# Patient Record
Sex: FEMALE | Race: WHITE | Employment: FULL TIME | URBAN - METROPOLITAN AREA
[De-identification: names, ages, dates, MRNs, and addresses within clinical notes are randomized per-mention and may not be internally consistent; named-entity substitution may affect disease eponyms.]

---

## 2017-01-23 LAB
ANTIBODY SCREEN, EXTERNAL: NEGATIVE
HBSAG, EXTERNAL: NEGATIVE
HIV, EXTERNAL: NEGATIVE
RPR, EXTERNAL: NON REACTIVE
RUBELLA, EXTERNAL: NORMAL
TYPE, ABO & RH, EXTERNAL: NORMAL

## 2017-06-14 ENCOUNTER — HOSPITAL ENCOUNTER (OUTPATIENT)
Dept: ULTRASOUND IMAGING | Age: 32
Discharge: HOME | End: 2017-06-14
Attending: OBSTETRICS & GYNECOLOGY

## 2017-06-14 ENCOUNTER — HOSPITAL ENCOUNTER (OUTPATIENT)
Dept: VASCULAR SURGERY | Age: 32
Discharge: HOME | End: 2017-06-14
Attending: OBSTETRICS & GYNECOLOGY
Payer: COMMERCIAL

## 2017-06-14 DIAGNOSIS — R60.0 LEG EDEMA, LEFT: ICD-10-CM

## 2017-06-14 DIAGNOSIS — R60.0 EDEMA LEG: ICD-10-CM

## 2017-06-14 PROCEDURE — 93971 EXTREMITY STUDY: CPT

## 2017-07-03 ENCOUNTER — TELEPHONE (OUTPATIENT)
Dept: INTERNAL MEDICINE CLINIC | Age: 32
End: 2017-07-03

## 2017-08-07 LAB — GRBS, EXTERNAL: NEGATIVE

## 2017-08-25 ENCOUNTER — HOSPITAL ENCOUNTER (INPATIENT)
Age: 32
LOS: 3 days | Discharge: HOME | End: 2017-08-28
Attending: OBSTETRICS & GYNECOLOGY | Admitting: OBSTETRICS & GYNECOLOGY
Payer: COMMERCIAL

## 2017-08-25 ENCOUNTER — ANESTHESIA EVENT (OUTPATIENT)
Dept: LABOR AND DELIVERY | Age: 32
End: 2017-08-25
Payer: COMMERCIAL

## 2017-08-25 ENCOUNTER — ANESTHESIA (OUTPATIENT)
Dept: LABOR AND DELIVERY | Age: 32
End: 2017-08-25
Payer: COMMERCIAL

## 2017-08-25 LAB
BASOPHILS # BLD: 0 K/UL (ref 0–0.1)
BASOPHILS NFR BLD: 0 % (ref 0–1)
EOSINOPHIL # BLD: 0 K/UL (ref 0–0.4)
EOSINOPHIL NFR BLD: 0 % (ref 0–7)
ERYTHROCYTE [DISTWIDTH] IN BLOOD BY AUTOMATED COUNT: 13.7 % (ref 11.5–14.5)
HCT VFR BLD AUTO: 37.4 % (ref 35–47)
HGB BLD-MCNC: 12.1 G/DL (ref 11.5–16)
LYMPHOCYTES # BLD: 2.1 K/UL (ref 0.8–3.5)
LYMPHOCYTES NFR BLD: 13 % (ref 12–49)
MCH RBC QN AUTO: 27.1 PG (ref 26–34)
MCHC RBC AUTO-ENTMCNC: 32.4 G/DL (ref 30–36.5)
MCV RBC AUTO: 83.9 FL (ref 80–99)
MONOCYTES # BLD: 1 K/UL (ref 0–1)
MONOCYTES NFR BLD: 6 % (ref 5–13)
NEUTS SEG # BLD: 13.4 K/UL (ref 1.8–8)
NEUTS SEG NFR BLD: 81 % (ref 32–75)
PLATELET # BLD AUTO: 326 K/UL (ref 150–400)
RBC # BLD AUTO: 4.46 M/UL (ref 3.8–5.2)
WBC # BLD AUTO: 16.5 K/UL (ref 3.6–11)

## 2017-08-25 PROCEDURE — 74011000250 HC RX REV CODE- 250

## 2017-08-25 PROCEDURE — 3E0R3CZ INTRODUCE REGIONAL ANESTH IN SPINAL CANAL, PERC: ICD-10-PCS | Performed by: ANESTHESIOLOGY

## 2017-08-25 PROCEDURE — 75410000002 HC LABOR FEE PER 1 HR

## 2017-08-25 PROCEDURE — 85025 COMPLETE CBC W/AUTO DIFF WBC: CPT | Performed by: OBSTETRICS & GYNECOLOGY

## 2017-08-25 PROCEDURE — 74011250636 HC RX REV CODE- 250/636: Performed by: ANESTHESIOLOGY

## 2017-08-25 PROCEDURE — 00HU33Z INSERTION OF INFUSION DEVICE INTO SPINAL CANAL, PERCUTANEOUS APPROACH: ICD-10-PCS | Performed by: ANESTHESIOLOGY

## 2017-08-25 PROCEDURE — 77030007880 HC KT SPN EPDRL BBMI -B

## 2017-08-25 PROCEDURE — 77030014125 HC TY EPDRL BBMI -B: Performed by: ANESTHESIOLOGY

## 2017-08-25 PROCEDURE — 74011250636 HC RX REV CODE- 250/636: Performed by: OBSTETRICS & GYNECOLOGY

## 2017-08-25 PROCEDURE — 65270000029 HC RM PRIVATE

## 2017-08-25 PROCEDURE — 77030011943

## 2017-08-25 PROCEDURE — 10907ZC DRAINAGE OF AMNIOTIC FLUID, THERAPEUTIC FROM PRODUCTS OF CONCEPTION, VIA NATURAL OR ARTIFICIAL OPENING: ICD-10-PCS | Performed by: OBSTETRICS & GYNECOLOGY

## 2017-08-25 PROCEDURE — 4A0HXCZ MEASUREMENT OF PRODUCTS OF CONCEPTION, CARDIAC RATE, EXTERNAL APPROACH: ICD-10-PCS | Performed by: OBSTETRICS & GYNECOLOGY

## 2017-08-25 PROCEDURE — 74011250636 HC RX REV CODE- 250/636

## 2017-08-25 PROCEDURE — 36415 COLL VENOUS BLD VENIPUNCTURE: CPT | Performed by: OBSTETRICS & GYNECOLOGY

## 2017-08-25 RX ORDER — SODIUM CHLORIDE 0.9 % (FLUSH) 0.9 %
5-10 SYRINGE (ML) INJECTION EVERY 8 HOURS
Status: DISCONTINUED | OUTPATIENT
Start: 2017-08-25 | End: 2017-08-28 | Stop reason: HOSPADM

## 2017-08-25 RX ORDER — DOXYLAMINE SUCCINATE AND PYRIDOXINE HYDROCHLORIDE, DELAYED RELEASE TABLETS 10 MG/10 MG 10; 10 MG/1; MG/1
TABLET, DELAYED RELEASE ORAL
COMMUNITY
End: 2017-08-28

## 2017-08-25 RX ORDER — BUPIVACAINE HYDROCHLORIDE 5 MG/ML
30 INJECTION, SOLUTION EPIDURAL; INTRACAUDAL AS NEEDED
Status: DISCONTINUED | OUTPATIENT
Start: 2017-08-25 | End: 2017-08-26 | Stop reason: HOSPADM

## 2017-08-25 RX ORDER — BUPIVACAINE HYDROCHLORIDE 5 MG/ML
INJECTION, SOLUTION EPIDURAL; INTRACAUDAL
Status: DISPENSED
Start: 2017-08-25 | End: 2017-08-26

## 2017-08-25 RX ORDER — SODIUM CHLORIDE 0.9 % (FLUSH) 0.9 %
5-10 SYRINGE (ML) INJECTION AS NEEDED
Status: DISCONTINUED | OUTPATIENT
Start: 2017-08-25 | End: 2017-08-28 | Stop reason: HOSPADM

## 2017-08-25 RX ORDER — FENTANYL CITRATE 50 UG/ML
INJECTION, SOLUTION INTRAMUSCULAR; INTRAVENOUS AS NEEDED
Status: DISCONTINUED | OUTPATIENT
Start: 2017-08-25 | End: 2017-08-26 | Stop reason: HOSPADM

## 2017-08-25 RX ORDER — FENTANYL/BUPIVACAINE/NS/PF 2-1250MCG
PREFILLED PUMP RESERVOIR EPIDURAL
Status: COMPLETED
Start: 2017-08-25 | End: 2017-08-25

## 2017-08-25 RX ORDER — FENTANYL/BUPIVACAINE/NS/PF 2-1250MCG
1-17 PREFILLED PUMP RESERVOIR EPIDURAL CONTINUOUS
Status: DISCONTINUED | OUTPATIENT
Start: 2017-08-25 | End: 2017-08-25 | Stop reason: SDUPTHER

## 2017-08-25 RX ORDER — LIDOCAINE HYDROCHLORIDE AND EPINEPHRINE 15; 5 MG/ML; UG/ML
INJECTION, SOLUTION EPIDURAL AS NEEDED
Status: DISCONTINUED | OUTPATIENT
Start: 2017-08-25 | End: 2017-08-26 | Stop reason: HOSPADM

## 2017-08-25 RX ORDER — NALOXONE HYDROCHLORIDE 0.4 MG/ML
0.4 INJECTION, SOLUTION INTRAMUSCULAR; INTRAVENOUS; SUBCUTANEOUS AS NEEDED
Status: DISCONTINUED | OUTPATIENT
Start: 2017-08-25 | End: 2017-08-26 | Stop reason: HOSPADM

## 2017-08-25 RX ORDER — FAMOTIDINE 10 MG/ML
INJECTION INTRAVENOUS
Status: COMPLETED
Start: 2017-08-25 | End: 2017-08-25

## 2017-08-25 RX ORDER — FAMOTIDINE 10 MG/ML
20 INJECTION INTRAVENOUS EVERY 12 HOURS
Status: DISCONTINUED | OUTPATIENT
Start: 2017-08-25 | End: 2017-08-28 | Stop reason: HOSPADM

## 2017-08-25 RX ORDER — FENTANYL/BUPIVACAINE/NS/PF 2-1250MCG
1-16 PREFILLED PUMP RESERVOIR EPIDURAL CONTINUOUS
Status: DISCONTINUED | OUTPATIENT
Start: 2017-08-25 | End: 2017-08-28 | Stop reason: HOSPADM

## 2017-08-25 RX ORDER — ONDANSETRON 2 MG/ML
4 INJECTION INTRAMUSCULAR; INTRAVENOUS
Status: DISCONTINUED | OUTPATIENT
Start: 2017-08-25 | End: 2017-08-28 | Stop reason: HOSPADM

## 2017-08-25 RX ORDER — FENTANYL CITRATE 50 UG/ML
INJECTION, SOLUTION INTRAMUSCULAR; INTRAVENOUS
Status: DISPENSED
Start: 2017-08-25 | End: 2017-08-26

## 2017-08-25 RX ORDER — OXYTOCIN IN 5 % DEXTROSE 30/500 ML
2-25 PLASTIC BAG, INJECTION (ML) INTRAVENOUS
Status: DISCONTINUED | OUTPATIENT
Start: 2017-08-25 | End: 2017-08-26 | Stop reason: HOSPADM

## 2017-08-25 RX ORDER — FENTANYL CITRATE 50 UG/ML
100 INJECTION, SOLUTION INTRAMUSCULAR; INTRAVENOUS ONCE
Status: DISPENSED | OUTPATIENT
Start: 2017-08-25 | End: 2017-08-26

## 2017-08-25 RX ORDER — SODIUM CHLORIDE, SODIUM LACTATE, POTASSIUM CHLORIDE, CALCIUM CHLORIDE 600; 310; 30; 20 MG/100ML; MG/100ML; MG/100ML; MG/100ML
125 INJECTION, SOLUTION INTRAVENOUS CONTINUOUS
Status: DISCONTINUED | OUTPATIENT
Start: 2017-08-25 | End: 2017-08-28 | Stop reason: HOSPADM

## 2017-08-25 RX ORDER — BUPIVACAINE HYDROCHLORIDE 5 MG/ML
INJECTION, SOLUTION EPIDURAL; INTRACAUDAL AS NEEDED
Status: DISCONTINUED | OUTPATIENT
Start: 2017-08-25 | End: 2017-08-26 | Stop reason: HOSPADM

## 2017-08-25 RX ORDER — FAMOTIDINE 20 MG/1
20 TABLET, FILM COATED ORAL 2 TIMES DAILY
COMMUNITY
End: 2017-08-28

## 2017-08-25 RX ORDER — FENTANYL CITRATE 50 UG/ML
100 INJECTION, SOLUTION INTRAMUSCULAR; INTRAVENOUS ONCE
Status: ACTIVE | OUTPATIENT
Start: 2017-08-25 | End: 2017-08-26

## 2017-08-25 RX ORDER — ONDANSETRON 2 MG/ML
INJECTION INTRAMUSCULAR; INTRAVENOUS
Status: COMPLETED
Start: 2017-08-25 | End: 2017-08-25

## 2017-08-25 RX ADMIN — FENTANYL 0.2 MG/100ML-BUPIV 0.125%-NACL 0.9% EPIDURAL INJ 11 ML/HR: 2/0.125 SOLUTION at 14:10

## 2017-08-25 RX ADMIN — SODIUM CHLORIDE, SODIUM LACTATE, POTASSIUM CHLORIDE, AND CALCIUM CHLORIDE 500 ML: 600; 310; 30; 20 INJECTION, SOLUTION INTRAVENOUS at 22:08

## 2017-08-25 RX ADMIN — LIDOCAINE HYDROCHLORIDE AND EPINEPHRINE 2 ML: 15; 5 INJECTION, SOLUTION EPIDURAL at 14:02

## 2017-08-25 RX ADMIN — FAMOTIDINE 20 MG: 10 INJECTION, SOLUTION INTRAVENOUS at 16:44

## 2017-08-25 RX ADMIN — ONDANSETRON 4 MG: 2 INJECTION INTRAMUSCULAR; INTRAVENOUS at 19:24

## 2017-08-25 RX ADMIN — LIDOCAINE HYDROCHLORIDE AND EPINEPHRINE 3 ML: 15; 5 INJECTION, SOLUTION EPIDURAL at 14:01

## 2017-08-25 RX ADMIN — SODIUM CHLORIDE, SODIUM LACTATE, POTASSIUM CHLORIDE, AND CALCIUM CHLORIDE 125 ML/HR: 600; 310; 30; 20 INJECTION, SOLUTION INTRAVENOUS at 23:21

## 2017-08-25 RX ADMIN — BUPIVACAINE HYDROCHLORIDE 2 ML: 5 INJECTION, SOLUTION EPIDURAL; INTRACAUDAL at 14:04

## 2017-08-25 RX ADMIN — SODIUM CHLORIDE, SODIUM LACTATE, POTASSIUM CHLORIDE, AND CALCIUM CHLORIDE 125 ML/HR: 600; 310; 30; 20 INJECTION, SOLUTION INTRAVENOUS at 20:41

## 2017-08-25 RX ADMIN — BUPIVACAINE HYDROCHLORIDE 2 ML: 5 INJECTION, SOLUTION EPIDURAL; INTRACAUDAL at 14:03

## 2017-08-25 RX ADMIN — FENTANYL CITRATE 50 MCG: 50 INJECTION, SOLUTION INTRAMUSCULAR; INTRAVENOUS at 14:05

## 2017-08-25 RX ADMIN — Medication 8 MILLI-UNITS/MIN: at 19:34

## 2017-08-25 RX ADMIN — Medication 2 MILLI-UNITS/MIN: at 17:41

## 2017-08-25 RX ADMIN — FENTANYL 0.2 MG/100ML-BUPIV 0.125%-NACL 0.9% EPIDURAL INJ 11 ML/HR: 2/0.125 SOLUTION at 23:42

## 2017-08-25 RX ADMIN — FENTANYL CITRATE 50 MCG: 50 INJECTION, SOLUTION INTRAMUSCULAR; INTRAVENOUS at 14:06

## 2017-08-25 NOTE — PROGRESS NOTES
Report received from Doc Granados RN    2120 Dr Kinjal Saldaña in room sve done discussing plan of action. 2202 Dr Kinjal Saldaña at desk strip viewed orders to stop pitocin; pitocin off    2207 Dr Kinjal Saldaña in room sve done arom of forebag done    2211 FSE applied procedure explained to pt by Dr Kinjal Saldaña    8/25/17    0007 sve done feeling pressure baby OP    0044 sve done c/c/+1 baby head transverse     0057 Dr Jonas Clinton in room bolus epidural for pressure. 36 Dr Kinjal Saldaña called report given on sve and position of baby doing position changes to rotate baby also epidural bolus to get pt comfortable. Instructed to continue to increase pitocin to get contraction in better pattern. 0205 pt complain that epidural level is up to her breast level epidural pump paused HOB elevated while call Dr [place] Clinton notified orders received to stop epidural pump for 45 minutes then restart. Pump off.    0327 Dr Kinjal Saldaña called report given baby OA at +2 station not feeling pressure continuing to increase pitocin will start pushing when pt feel pressure. No additional orders given. 0406 having late decels pitocin off Dr Kinjal Saldaña called to bedside fluid bolus started turn to left side    0503 spontaneous vaginal delivery of baby boy by Dr Kinjal Saldaña with positive heart rate taken to stabelet for assessment by Rn     0505 NICU call to delivery    0645 sponge bath completed gown changed     0725 Pt transferred to [facility]    0750 TRANSFER - OUT REPORT:    Verbal report given to SHELLY Barnes Rn(name) on [employer]  being transferred to [facility](unit) for routine progression of care       Report consisted of patients Situation, Background, Assessment and   Recommendations(SBAR). Information from the following report(s) SBAR, Procedure Summary, MAR and Recent Results was reviewed with the receiving nurse. Lines:   Peripheral IV 08/25/17 Left; Lower Arm (Active)        Opportunity for questions and clarification was provided.       Patient transported via stretcher in stable condition with belongings baby transferred with mother in open crib.

## 2017-08-25 NOTE — PROGRESS NOTES
1625 Bedside shift change report given to Meg Rodriguez RN (oncoming nurse) by Armen Vigil RN (offgoing nurse). Report included the following information SBAR.     1700 MD at  evaluating pt progress, deferred SVE at this time, evaluated EFM; continue to titrate pitocin. 1730 Bedside shift change report given to Samantha Mar RN (oncoming nurse) by Meg Rodriguez RN (offgoing nurse). Report included the following information SBAR.

## 2017-08-25 NOTE — IP AVS SNAPSHOT
2700 [facility] [address] [place] [address] 
[phone] Patient: Miranda Joiner MRN: [id number] :1985 You are allergic to the following No active allergies Immunizations Administered for This Admission Name Date MMR 2017 Recent Documentation Height Weight Breastfeeding? BMI OB Status Smoking Status 1.626 m 90.4 kg Yes 34.19 kg/m2 Recent pregnancy Former Smoker Unresulted Labs Order Current Status SAMPLE TO BLOOD BANK In process Emergency Contacts Name Discharge Info Relation Home Work Mobile Tati Meadows   [phone] About your hospitalization You were admitted on:  2017 You last received care in the:  [address] [facility] You were discharged on:  2017 Unit phone number:  [phone] Why you were hospitalized Your primary diagnosis was:  Not on File Your diagnoses also included:  Labor Without Complication Providers Seen During Your Hospitalizations Provider Role Specialty Primary office phone Pete Castillo MD Attending Provider Obstetrics & Gynecology [phone] Your Primary Care Physician (PCP) Primary Care Physician Office Phone Office Fax Gabino Jackson [address], [facility] [phone] Follow-up Information Follow up With Details Comments Contact Info [facility] Call As needed with breastfeeding questions [address] [place] [address] Drive, [address] 
[phone] Hunter Cannon MD   [address] [facility] [address] [place] [address] 
[phone] Pete Castillo MD Schedule an appointment as soon as possible for a visit in 6 weeks Routine postpartum follow-up appointment 217 [facility] 201 1400 [address] [place] [address] 
[phone] Current Discharge Medication List  
  
START taking these medications Dose & Instructions Dispensing Information Comments Morning Noon Evening Bedtime  
 oxyCODONE-acetaminophen 5-325 mg per tablet Commonly known as:  PERCOCET Your last dose was: Your next dose is:    
   
   
 Dose:  1 Tab Take 1 Tab by mouth every four (4) hours as needed. Max Daily Amount: 6 Tabs. Quantity:  10 Tab Refills:  0 CONTINUE these medications which have CHANGED Dose & Instructions Dispensing Information Comments Morning Noon Evening Bedtime  
 ibuprofen 800 mg tablet Commonly known as:  MOTRIN What changed:   
- medication strength 
- how much to take - when to take this 
- reasons to take this Your last dose was: Your next dose is:    
   
   
 Dose:  800 mg Take 1 Tab by mouth every eight (8) hours. Quantity:  30 Tab Refills:  0 CONTINUE these medications which have NOT CHANGED Dose & Instructions Dispensing Information Comments Morning Noon Evening Bedtime RIGHT STEP PRENATAL VITAMINS 27 mg iron- 0.8 mg Tab tablet Generic drug:  prenatal vit-iron fumarate-fa Your last dose was: Your next dose is:    
   
   
 Dose:  1 Tab Take 1 Tab by mouth daily. Refills:  0 STOP taking these medications   
 acyclovir 5 % ointment Commonly known as:  ZOVIRAX  
   
  
 antipyrine-benzocaine 5.5-1.4 % Drop Commonly known as:  AURALGAN  
   
  
 azithromycin 250 mg tablet Commonly known as:  Toxey Maria D DICLEGIS 10-10 mg Tbec Generic drug:  doxylamine-pyridoxine Krunal & Krunal DM 60-1,200 mg Tm12 Generic drug:  Dextromethorphan-guaiFENesin ORTHO TRI-CYCLEN LO (28) 0.18/0.215/0.25 mg-25 mcg Tab Generic drug:  norgestimate-ethinyl estradiol PEPCID 20 mg tablet Generic drug:  famotidine Where to Get Your Medications Information on where to get these meds will be given to you by the nurse or doctor. ! Ask your nurse or doctor about these medications  
  ibuprofen 800 mg tablet  
 oxyCODONE-acetaminophen 5-325 mg per tablet Discharge Instructions Discharge Instructions for Vaginal Delivery Patient ID: 
Radha Samples [id number] 
27 y.o. 
1985 Take Home Medications See medication list 
Continue taking your prenatal vitamins if you are breastfeeding. Follow-up Appointment: 
Follow-up with Dr. Barahona Matters in 6 weeks. Follow-up care is a key part of your treatment and safety. Be sure to make and go to all appointments, and call your doctor if you are having problems. Its also a good idea to know your test results and keep a list of the medicines you take. Activity Avoid anything in your vagina for 6 weeks (no intercourse, tampons, or douching). You may drive unless you are taking prescription pain medications. Climbing stairs and light lifting are ok after a vaginal delivery unless your doctor tells you not to. You may gradually work up to exercise over the next few weeks, but take it easy- you'll be tired! Diet You may eat a regular diet but you may want to avoid heavy, greasy foods and other foods that could increase constipation. Wound care If you have stitches, continue to rinse with a squirt bottle of warm water each time you use the bathroom for about 2 weeks. Your stitches will gradually dissolve over several weeks. Sitz baths are also helpful to keep the wound clean, encourage healing, and to help with pain associated with the stitches or hemorrhoids. You can use either a sitz bath basin or a bathtub filled with 2-3\" inches of plain warm water. Soak for 10 minutes 3 times a day. Pain Management If you were not given a prescription pain medication, you can take over the counter pain medicines like Tylenol (acetominophen), Advil or Motrin (ibuprofen). You can take acetominophen and ibuprofen together, alternating doses every few hours. You will get the most relief if you take the maximum dose: · Tylenol or acetominophen 1000 mg every 6 hours (equivalent to 2 Extra Strength Tylenols every 6 hours) · Motrin or Advil (generic ibuprofen) 800 mg every 8 hours (4 tablets or capsules every 8 hours) If you were given a prescription pain medication, you can take ibuprofen along with it (doses as above), but not Tylenol. Use ibuprofen as the main medication, and take the prescription medication if needed for more severe pain not relieved by the ibuprofen. Your goal should be to take only the minimum necessary amounts of the prescription medication (narcotic), as these pain medicines can be habit-forming and will worsen or cause constipation. Most patients will find that within a couple of days, their pain is adequately controlled using only over-the-counter medications. A heating pad can also be very helpful for cramping or back pain. Over-the-counter hemorrhoid wipes and ointments are fine to use if you have hemorrhoids. Constipation You may find that bowel movements are irregular after delivery and that you have a tendency to be constipated. If you have stitches (and especially if you had a more severe tear called a third- or fourth-degree), your bowel movements will be more comfortable if they are soft. A stool softener such as Colace (docusate) can safely be taken daily if needed. If you become constipated you can use a laxative such as Dulcolax, Miralax or Milk of Magnesia. Don't wait until constipation is severe- take something sooner rather than later and you will feel much better! Your Recovery: What to Expect at [facility] Delivering a baby is hard work and you probably aren't getting much sleep, so you will certainly be tired. Try to rest when you can and don't worry about doing housework or other tasks which can wait. If you're experiencing soreness or swelling in your bottom, this should improve over the next few days to 2 weeks. You are likely to have some back pain or general body aches or muscle soreness. This should improve with acetominophen or ibuprofen. If your legs were swollen during your pregnancy or as a result of IV fluids given during your hospital stay, this should go away in a few days to a week. Most women experience some form of the \"Baby Blues\" after having a baby. This means that you may feel emotional, tearful, frustrated, anxious, sad, and irritable some of the time. This is normal if it's not severe and should go away after about 2 weeks. Getting as much rest as you can will help. Accept assistance from friends and family members so that you can take breaks from caring for the baby. Call your doctor if your symptoms seem severe, last more than 2 weeks, or seem to be getting worse instead of better. Get help immediately if you have thoughts of wanting to hurt yourself or others! When should you call for help? Call 911 anytime you think you may need emergency care. For example, call if: You pass out (lose consciousness). You have sudden chest pain and shortness of breath, or you cough up blood. You have severe pain in your belly. Call your doctor now or seek immediate medical care if: 
You have heavy vaginal bleeding that soaks one or more pads in an hour, or you have large clots (golf ball size or larger). Your have foul-smelling discharge from your vagina. You are sick to your stomach or cannot keep fluids down. You have pain that does not get better after you take pain medicine. You have signs of infection, such as: Increased pain in your abdomen or vaginal area Red streaks, warmth, or tenderness of your breasts. A fever of 101 or greater (taken by mouth). You have signs of a blood clot, such as: 
Pain in your calf, back of knee, thigh, or groin. Redness and swelling in your leg or groin. You have trouble passing urine or stool, especially if you have pain or swelling in your lower belly; or if you are unable to have a bowel movement after taking a laxative. You have a fast or pounding heartbeat. Discharge Orders None [employer] Announcement We are excited to announce that we are making your provider's discharge notes available to you in [employer]. You will see these notes when they are completed and signed by the physician that discharged you from your recent hospital stay. If you have any questions or concerns about any information you see in [employer], please call the Health Information Department where you were seen or reach out to your Primary Care Provider for more information about your plan of care. Introducing [address]! [employer] introduces [employer] patient portal. Now you can access parts of your medical record, email your doctor's office, and request medication refills online. 1. In your internet browser, go to [email or web address] 2. Click on the First Time User? Click Here link in the Sign In box. You will see the New Member Sign Up page. 3. Enter your [employer] Access Code exactly as it appears below. You will not need to use this code after youve completed the sign-up process. If you do not sign up before the expiration date, you must request a new code. · [employer] Access Code: [email or web address] Expires: 9/12/2017  4:08 PM 
 
4. Enter the last four digits of your Social Security Number (xxxx) and Date of Birth (mm/dd/yyyy) as indicated and click Submit. You will be taken to the next sign-up page. 5. Create a [employer] ID. This will be your [employer] login ID and cannot be changed, so think of one that is secure and easy to remember. 6. Create a [employer] password. You can change your password at any time. 7. Enter your Password Reset Question and Answer.  This can be used at a later time if you forget your password. 8. Enter your e-mail address. You will receive e-mail notification when new information is available in [address] 9. Click Sign Up. You can now view and download portions of your medical record. 10. Click the Download Summary menu link to download a portable copy of your medical information. If you have questions, please visit the Frequently Asked Questions section of the [employer] website. Remember, [employer] is NOT to be used for urgent needs. For medical emergencies, dial 911. Now available from your iPhone and Android! General Information Please provide this summary of care documentation to your next provider. Patient Signature:  ____________________________________________________________ Date:  ____________________________________________________________  
  
Jessi Parry Provider Signature:  ____________________________________________________________ Date:  ____________________________________________________________

## 2017-08-25 NOTE — H&P
History & Physical    Name: Jyothi Amanda MRN: [id number]  SSN: [id number]    YOB: 1985  Age: 28 y.o. Sex: female        Subjective:     Estimated Date of Delivery: 17  OB History    Para Term  AB Living   1        SAB TAB Ectopic Molar Multiple Live Births              # Outcome Date GA Lbr Rui/2nd Weight Sex Delivery Anes PTL Lv   1 Current                   Ms. Corey Rodriguez is admitted with pregnancy at 36w3d for active labor. Prenatal course was complicated by rubella non-immune, mildly decreased TSH and elevated T3-no treatment. Please see prenatal records for details. Past Medical History:   Diagnosis Date    Herpes simplex virus (HSV) infection     Cold sores- winter 2016    Other ill-defined conditions     IBS; thyroid issues    Thyroid activity decreased     Mildly decreased TSH, mildly elevated T3, no current treatment     Past Surgical History:   Procedure Laterality Date    HX WISDOM TEETH EXTRACTION       Social History     Occupational History    Not on file. Social History Main Topics    Smoking status: Former Smoker     Years: 7.00     Quit date: 2016    Smokeless tobacco: Never Used    Alcohol use Yes      Comment: Not during Pregnancy    Drug use: No    Sexual activity: Yes     Birth control/ protection: Pill, None     Family History   Problem Relation Age of Onset    No Known Problems Mother     Diabetes Father     No Known Problems Brother     Heart Disease Maternal Grandmother     Heart Disease Maternal Grandfather     Cancer Paternal Grandmother      unknown    Cancer Paternal Grandfather      unknown       No Known Allergies  Prior to Admission medications    Medication Sig Start Date End Date Taking? Authorizing Provider   doxylamine-pyridoxine (DICLEGIS) 10-10 mg TbEC Take  by mouth.  Indications: NAUSEA GRAVIDARUM   Yes Historical Provider   prenatal vit-iron fumarate-fa (RIGHT STEP PRENATAL VITAMINS) 27 mg iron- 0.8 mg tab tablet Take 1 Tab by mouth daily. Yes Historical Provider   famotidine (PEPCID) 20 mg tablet Take 20 mg by mouth two (2) times a day. Yes Historical Provider   antipyrine-benzocaine (AURALGAN) 5.5-1.4 % drop 2 Drops by Otic route four (4) times daily as needed. 2/28/14   Brandy Stephens III, MD   Dextromethorphan-Guaifenesin [facility] DM) 60-1,200 mg TM12 Take 1 Cap by mouth two (2) times a day. 2/26/14   Brandy Stephens III, MD   azithromycin (ZITHROMAX) 250 mg tablet Take 2 tablets today, then take 1 tablet daily. 2/26/14   Brandy Stephens III, MD   ibuprofen (ADVIL) 200 mg tablet Take 3 Tabs by mouth every eight (8) hours as needed for Pain. 2/26/14   Brandy Stephens III, MD   acyclovir (ZOVIRAX) 5 % ointment Apply  to affected area every three (3) hours. 2/26/14   Samir Tyson MD   ORTHO TRI-CYCLEN LO 0.18/0.215/0.25 mg-25 mcg tablet  8/4/10   Historical Provider        Review of Systems: Pertinent items are noted in HPI. Objective:     Vitals:  Vitals:    08/25/17 1445 08/25/17 1500 08/25/17 1539 08/25/17 1540   BP: 112/51 (!) 88/69  103/60   Pulse: (!) 102 (!) 112  91   Resp: 16 16  16   Temp:       SpO2: 98% 99% 98%    Weight:       Height:            Physical Exam:  Patient without distress.   Abdomen: soft, nontender, gravid  Cervical Exam: 4 cm dilated    60% effaced    -2 station    Presenting Part: cephalic  Lower Extremities:  - Edema 1+  Membranes:  intact and bulging  Fetal Heart Rate: Reactive  Baseline: 125s per minute  Variability: moderate  Accelerations: yes  Decelerations: none  Uterine contractions: irregular, every 3-7 minutes    Prenatal Labs:   Lab Results   Component Value Date/Time    Rubella, External Non-Immune 01/23/2017    GrBStrep, External Negative 08/07/2017    HBsAg, External Negative 01/23/2017    HIV, External Negative 01/23/2017    RPR, External Non Reactive 01/23/2017    ABO,Rh O POSITIVE 01/23/2017         Assessment/Plan:     Active Problems:    Labor without complication (8/25/2017)         Plan: Admit for Labor  Not progressing normally  start pitocin augmentation. Group B Strep was negative.     Signed By:  Mehreen Jackson, DO     August 25, 2017

## 2017-08-25 NOTE — ANESTHESIA PROCEDURE NOTES
Epidural Block    Start time: 8/25/2017 1:55 PM  End time: 8/25/2017 2:07 PM  Performed by: Janis Brooks by: Rajesh Kimbrough     Pre-Procedure  Indication: labor epidural    Preanesthetic Checklist: patient identified, risks and benefits discussed, anesthesia consent, site marked, patient being monitored, timeout performed and anesthesia consent    Timeout Time: 13:55        Epidural:   Patient position:  Left lateral decubitus  Prep region:  Lumbar  Prep: Chlorhexidine    Location:  L3-4    Needle and Epidural Catheter:   Needle Type:  Tuohy  Needle Gauge:  17 G  Injection Technique:  Loss of resistance using air  Attempts:  1  Catheter Size:  19 G  Catheter at Skin Depth (cm):  11  Depth in Epidural Space (cm):  6  Events: no blood with aspiration, no cerebrospinal fluid with aspiration, no paresthesia and negative aspiration test    Test Dose:  Negative and lidocaine 1.5% w/ epi    Assessment:   Catheter Secured:  Tegaderm and tape  Insertion:  Uncomplicated  Patient tolerance:  Patient tolerated the procedure well with no immediate complications

## 2017-08-25 NOTE — PROGRESS NOTES
1037 Patient arrived from Dr Marina Pierre office for R/O labor. Patient says been having regular contractions since 1900 last night.  Positive fetal movement, no vaginal bleeding or leaking of fluid

## 2017-08-25 NOTE — IP AVS SNAPSHOT
[address] 
[phone] Patient: Ady Amezcua MRN: [id number] :1985 Current Discharge Medication List  
  
START taking these medications Dose & Instructions Dispensing Information Comments Morning Noon Evening Bedtime  
 oxyCODONE-acetaminophen 5-325 mg per tablet Commonly known as:  PERCOCET Your last dose was: Your next dose is:    
   
   
 Dose:  1 Tab Take 1 Tab by mouth every four (4) hours as needed. Max Daily Amount: 6 Tabs. Quantity:  10 Tab Refills:  0 CONTINUE these medications which have CHANGED Dose & Instructions Dispensing Information Comments Morning Noon Evening Bedtime  
 ibuprofen 800 mg tablet Commonly known as:  MOTRIN What changed:   
- medication strength 
- how much to take - when to take this 
- reasons to take this Your last dose was: Your next dose is:    
   
   
 Dose:  800 mg Take 1 Tab by mouth every eight (8) hours. Quantity:  30 Tab Refills:  0 CONTINUE these medications which have NOT CHANGED Dose & Instructions Dispensing Information Comments Morning Noon Evening Bedtime RIGHT STEP PRENATAL VITAMINS 27 mg iron- 0.8 mg Tab tablet Generic drug:  prenatal vit-iron fumarate-fa Your last dose was: Your next dose is:    
   
   
 Dose:  1 Tab Take 1 Tab by mouth daily. Refills:  0 STOP taking these medications   
 acyclovir 5 % ointment Commonly known as:  ZOVIRAX  
   
  
 antipyrine-benzocaine 5.5-1.4 % Drop Commonly known as:  AURALGAN  
   
  
 azithromycin 250 mg tablet Commonly known as:  Derek Maria D DICLEGIS 10-10 mg Tbec Generic drug:  doxylamine-pyridoxine Ji[address] 598 DM 60-1,200 mg Tm12 Generic drug:  Dextromethorphan-guaiFENesin ORTHO TRI-CYCLEN BLANCA (28) 0.18/0.215/0.25 mg-25 mcg Tab Generic drug:  norgestimate-ethinyl estradiol PEPCID 20 mg tablet Generic drug:  famotidine Where to Get Your Medications Information on where to get these meds will be given to you by the nurse or doctor. ! Ask your nurse or doctor about these medications  
  ibuprofen 800 mg tablet  
 oxyCODONE-acetaminophen 5-325 mg per tablet

## 2017-08-25 NOTE — PROGRESS NOTES
1024 Pt off monitor to ambulate in the room, instructed pt to try knee chest position for back pain relief. 1145 SVE 4/70/-2. Dr Randal Calero aware, plan to admit for labor. [address] Dr Salena Dominguez at bedside to place epidural.   1445 Pt called out, concerned about numbness in right arm, right side of her face. Dermatome level assessed, Dr Salena Dominguez called to notify. Order received to stop epidural infusion for 1 hour. [address] Salena Dominguez notified of dermatome level, order received to keep pump off for another hour. 1625 Bedside report given to Mackenzie Saab RN.

## 2017-08-25 NOTE — ANESTHESIA PREPROCEDURE EVALUATION
Anesthetic History   No history of anesthetic complications            Review of Systems / Medical History  Patient summary reviewed, nursing notes reviewed and pertinent labs reviewed    Pulmonary  Within defined limits                 Neuro/Psych   Within defined limits           Cardiovascular  Within defined limits                Exercise tolerance: >4 METS     GI/Hepatic/Renal  Within defined limits              Endo/Other      Hypothyroidism: well controlled       Other Findings   Comments: tiup           Physical Exam    Airway  Mallampati: II  TM Distance: > 6 cm  Neck ROM: normal range of motion   Mouth opening: Normal     Cardiovascular  Regular rate and rhythm,  S1 and S2 normal,  no murmur, click, rub, or gallop             Dental  No notable dental hx       Pulmonary  Breath sounds clear to auscultation               Abdominal  GI exam deferred       Other Findings            Anesthetic Plan    ASA: 2  Anesthesia type: epidural      Post-op pain plan if not by surgeon: indwelling epidural catheter    Induction: Intravenous  Anesthetic plan and risks discussed with: Patient and Spouse

## 2017-08-26 PROCEDURE — 74011250636 HC RX REV CODE- 250/636: Performed by: ANESTHESIOLOGY

## 2017-08-26 PROCEDURE — 77030005537 HC CATH URETH BARD -A

## 2017-08-26 PROCEDURE — 77030011943

## 2017-08-26 PROCEDURE — 75410000002 HC LABOR FEE PER 1 HR

## 2017-08-26 PROCEDURE — 76060000078 HC EPIDURAL ANESTHESIA

## 2017-08-26 PROCEDURE — 74011250637 HC RX REV CODE- 250/637: Performed by: OBSTETRICS & GYNECOLOGY

## 2017-08-26 PROCEDURE — 65410000002 HC RM PRIVATE OB

## 2017-08-26 PROCEDURE — 75410000000 HC DELIVERY VAGINAL/SINGLE

## 2017-08-26 PROCEDURE — 75410000003 HC RECOV DEL/VAG/CSECN EA 0.5 HR

## 2017-08-26 RX ORDER — SIMETHICONE 80 MG
80 TABLET,CHEWABLE ORAL
Status: DISCONTINUED | OUTPATIENT
Start: 2017-08-26 | End: 2017-08-28 | Stop reason: HOSPADM

## 2017-08-26 RX ORDER — HYDROCORTISONE ACETATE PRAMOXINE HCL 2.5; 1 G/100G; G/100G
CREAM TOPICAL AS NEEDED
Status: DISCONTINUED | OUTPATIENT
Start: 2017-08-26 | End: 2017-08-28 | Stop reason: HOSPADM

## 2017-08-26 RX ORDER — SODIUM CHLORIDE 0.9 % (FLUSH) 0.9 %
5-10 SYRINGE (ML) INJECTION AS NEEDED
Status: DISCONTINUED | OUTPATIENT
Start: 2017-08-26 | End: 2017-08-28 | Stop reason: HOSPADM

## 2017-08-26 RX ORDER — OXYCODONE AND ACETAMINOPHEN 5; 325 MG/1; MG/1
2 TABLET ORAL
Status: DISCONTINUED | OUTPATIENT
Start: 2017-08-26 | End: 2017-08-28 | Stop reason: HOSPADM

## 2017-08-26 RX ORDER — IBUPROFEN 400 MG/1
800 TABLET ORAL EVERY 8 HOURS
Status: DISCONTINUED | OUTPATIENT
Start: 2017-08-26 | End: 2017-08-28 | Stop reason: HOSPADM

## 2017-08-26 RX ORDER — AMMONIA 15 % (W/V)
1 AMPUL (EA) INHALATION AS NEEDED
Status: DISCONTINUED | OUTPATIENT
Start: 2017-08-26 | End: 2017-08-28 | Stop reason: HOSPADM

## 2017-08-26 RX ORDER — HYDROCORTISONE 1 %
CREAM (GRAM) TOPICAL AS NEEDED
Status: DISCONTINUED | OUTPATIENT
Start: 2017-08-26 | End: 2017-08-28 | Stop reason: HOSPADM

## 2017-08-26 RX ORDER — OXYCODONE AND ACETAMINOPHEN 5; 325 MG/1; MG/1
1 TABLET ORAL
Status: DISCONTINUED | OUTPATIENT
Start: 2017-08-26 | End: 2017-08-28 | Stop reason: HOSPADM

## 2017-08-26 RX ORDER — ONDANSETRON 4 MG/1
4 TABLET, ORALLY DISINTEGRATING ORAL
Status: DISCONTINUED | OUTPATIENT
Start: 2017-08-26 | End: 2017-08-28 | Stop reason: HOSPADM

## 2017-08-26 RX ORDER — DOCUSATE SODIUM 100 MG/1
100 CAPSULE, LIQUID FILLED ORAL
Status: DISCONTINUED | OUTPATIENT
Start: 2017-08-26 | End: 2017-08-28 | Stop reason: HOSPADM

## 2017-08-26 RX ORDER — ZOLPIDEM TARTRATE 5 MG/1
5 TABLET ORAL
Status: DISCONTINUED | OUTPATIENT
Start: 2017-08-26 | End: 2017-08-28 | Stop reason: HOSPADM

## 2017-08-26 RX ORDER — OXYTOCIN/RINGER'S LACTATE 20/1000 ML
125-1000 PLASTIC BAG, INJECTION (ML) INTRAVENOUS AS NEEDED
Status: DISCONTINUED | OUTPATIENT
Start: 2017-08-26 | End: 2017-08-28 | Stop reason: HOSPADM

## 2017-08-26 RX ORDER — SODIUM CHLORIDE 0.9 % (FLUSH) 0.9 %
5-10 SYRINGE (ML) INJECTION EVERY 8 HOURS
Status: DISCONTINUED | OUTPATIENT
Start: 2017-08-26 | End: 2017-08-28 | Stop reason: HOSPADM

## 2017-08-26 RX ORDER — DIPHENHYDRAMINE HCL 25 MG
25 CAPSULE ORAL
Status: DISCONTINUED | OUTPATIENT
Start: 2017-08-26 | End: 2017-08-28 | Stop reason: HOSPADM

## 2017-08-26 RX ADMIN — IBUPROFEN 800 MG: 400 TABLET, FILM COATED ORAL at 09:10

## 2017-08-26 RX ADMIN — IBUPROFEN 800 MG: 400 TABLET, FILM COATED ORAL at 16:57

## 2017-08-26 NOTE — PROGRESS NOTES
Labor Progress Note  Patient seen, fetal heart rate and contraction pattern evaluated. Feeling pressure and the urge to push     Physical Exam:  Afebrile  Cervical Exam: C/C/+2 with mild caput  Membranes:  Intact  Uterine Activity: Frequency: regular  Fetal Heart Rate: Reactive  Accelerations: yes  Decelerations: variable  Uterine contractions:every 3-4 mins    Assessment/Plan:  Reassuring fetal status.    Will prepare to Carlos Manuel Sousa MD

## 2017-08-26 NOTE — PROGRESS NOTES
TRANSFER - IN REPORT:    Verbal report received from Nayeli RN (name) on Crow Valencia  being received from L&D (unit) for routine progression of care      Report consisted of patients Situation, Background, Assessment and   Recommendations(SBAR). Information from the following report(s) SBAR, Kardex and MAR was reviewed with the receiving nurse. Opportunity for questions and clarification was provided. Assessment completed upon patients arrival to unit and care assumed. 0930-patient ambulated to the bathroom, gait steady. Voided a large amount of clear yellow urine. Educated on [employer] and pericare completed.      1215-patient ambulated to bathroom, voided a large amount of clear yellow urine

## 2017-08-26 NOTE — PROGRESS NOTES
Labor Progress Note  Patient seen, fetal heart rate and contraction pattern evaluated. Comfortable with Epidural.    Physical Exam:  Cervical Exam:4-5/70/-2  Membranes:  Intact  Uterine Activity: Frequency: Irregular  Fetal Heart Rate: Reactive  Accelerations: yes  Decelerations: none  Uterine contractions: irregular    Assessment/Plan:  Reassuring fetal status.    Will continue to titrate pitocin    Maximiliano Padilla MD

## 2017-08-26 NOTE — PROGRESS NOTES
OB Hospitalist    Labor Progress Note  Assumed care from Dr. Duong Sheppard. 29 y/o G1@ 39 weeks 1 day admitted this afternoon in early labor. Status post Epidural. Started on pitocin around 5 pm as contractions had spaced out. Patient seen, fetal heart rate and contraction pattern evaluated. Physical Exam:  Cervical Exam: not examined: last check at 5 pm 4/60/-2  Membranes:  Intact  Uterine Activity: Frequency: Irregular  Fetal Heart Rate: Reactive  Accelerations: yes  Decelerations: none  Uterine contractions: irregular    Assessment/Plan:  Reassuring fetal status.    Will continue to titrate Sarah Gonzalez MD

## 2017-08-26 NOTE — PROGRESS NOTES
OB Hospitalist    3 late decels noted resolved with left lateral postioning and oxygen. Pitocin turned off. AROM. Clear fluid noted. FSE placed. Cervix 6 /80/-1  Fetal tracing Baseline 120, moderate variability with accels noted.       Dr. Brandon Zaidi

## 2017-08-26 NOTE — LACTATION NOTE
This note was copied from a baby's chart. Initial Lactation Consultation: Infant born this morning to a  mom at 44 2/7 weeks gestation. Mom has flat nipples, using a latch assist. Infant's frenulum appears to be minimally limiting to his tongue extension. Assisted mom with getting infant latched; deep latch obtained, rhythmic sucking noted. Mom notes uterine marlee while nursing. Colostrum noted upon expression.  behaviors reviewed, Very sleepy in first 24 hours, mother must wake baby for feedings, offer hand expressed drops, baby usually will respond and feed vigorously 6-8 times in the first day, but is important to offer 8-12 times,  After baby wakes from deep sleep usually on the 2nd or 3rd day a new behavior pattern follows. Frequent feeding during this brief behavioral phase preceeding milk transition is called cluster feeding. Typical  behavior: baby becomes vigorous at the breast and wants to feed frequently- every 1-2 hours for several feedings. . This is the normal process by which the baby demands his/her supply. This type of frequent feeding is the stimulation which causes lactogenesis II (milk coming in). Skin to skin discussed with mom. The benefits include infants temperature regulation, decrease stress in mom and baby, increase in maternal milk production and allowing mom to see early feeding cues. Feeding Plan: Mother will keep baby skin to skin as often as possible, feed on demand, 8-12x/day , respond to feeding cues, obtain latch, listen for audible swallowing, be aware of signs of oxytocin release/ cramping,thirst,sleepiness while breastfeeding, offer both breasts,and will not limit feedings.

## 2017-08-26 NOTE — PROGRESS NOTES
This patient was 30 or more weeks gestation at the time of [facility] go-live. For complete information pertaining to this patient's pregnancy, please refer to the paper chart and ACOG form. Delivery Note    Obstetrician:  Yasmany Trinidad MD    Assistant: none    Pre-Delivery Diagnosis: Term pregnancy or Spontaneous labor    Post-Delivery Diagnosis: Living  infant(s) or Male    Intrapartum Event: None    Procedure: Spontaneous vaginal delivery    Epidural: YES    Monitor:  Fetal Heart Tones - and Uterine Contractions - External    Indications for instrumental delivery: none    Estimated Blood Loss: 300cc    Episiotomy: none    Laceration(s): Right periurethral-Hemostatic    Laceration(s) repair: NO    Presentation: Cephalic    Fetal Description: berman    Fetal Position: Occiput Anterior    Birth Weight:     Birth Length:     Apgar - One Minute: 2    Apgar - Five Minutes: 8    Umbilical Cord: 3 vessels present    Specimens:            Complications:  none           Cord Blood Results:   Information for the patient's :  Jodie Meredith [[id number]]   No results found for: PCTABR, PCTDIG, BILI, ABORH    Prenatal Labs:     Lab Results   Component Value Date/Time    ABO,Rh O POSITIVE 2017    HBsAg, External Negative 2017    HIV, External Negative 2017    Rubella, External Non-Immune 2017    RPR, External Non Reactive 2017    GrBStrep, External Negative 2017        Attending Attestation: I performed the procedure    Signed By:  Yasmany Trinidad MD     2017

## 2017-08-27 PROCEDURE — 65410000002 HC RM PRIVATE OB

## 2017-08-27 PROCEDURE — 74011250637 HC RX REV CODE- 250/637: Performed by: OBSTETRICS & GYNECOLOGY

## 2017-08-27 RX ADMIN — IBUPROFEN 800 MG: 400 TABLET, FILM COATED ORAL at 09:06

## 2017-08-27 RX ADMIN — IBUPROFEN 800 MG: 400 TABLET, FILM COATED ORAL at 00:54

## 2017-08-27 RX ADMIN — IBUPROFEN 800 MG: 400 TABLET, FILM COATED ORAL at 17:12

## 2017-08-27 NOTE — PROGRESS NOTES
Post-Partum Day Number 1 Progress Note    Patient doing well post-partum without significant complaint. Voiding withour difficulty, normal lochia. Vitals:  Patient Vitals for the past 8 hrs:   BP Temp Pulse Resp   17 0907 118/72 97.9 °F (36.6 °C) 72 14   17 0325 105/70 98.3 °F (36.8 °C) 86 16     Temp (24hrs), Av.2 °F (36.8 °C), Min:97.9 °F (36.6 °C), Max:98.6 °F (37 °C)      Vital signs stable, afebrile. Exam:  Patient without distress. Abdomen soft, fundus firm at level of umbilicus, nontender               Perineum with normal lochia noted. Lower extremities are negative for swelling, cords or tenderness. Lab/Data Review: All lab results for the last 24 hours reviewed. Assessment and Plan:  Patient appears to be having uncomplicated post-partum course. Continue routine perineal care and maternal education. Plan discharge tomorrow if no problems occur.

## 2017-08-27 NOTE — ROUTINE PROCESS
2000- Bedside shift change report given to PANFILO White RN (oncoming nurse) by SHELLY Gonzales [facility], RN (offgoing nurse). Report included the following information SBAR.

## 2017-08-27 NOTE — LACTATION NOTE
This note was copied from a baby's chart. Baby nursing well today per mom. Mom will continue to offer the infant the breast 8-12 times in 24 hours to facilitate lactogenesis. Mom states infant is obtaining a deep latch and that she is comfortable when he nurses. Opportunity for questions offered.

## 2017-08-27 NOTE — ROUTINE PROCESS
1455: Bedside and Verbal shift change report given to RAMIRO Escobedo (oncoming nurse) by Divina Ingram RN (offgoing nurse). Report included the following information SBAR.

## 2017-08-28 VITALS
BODY MASS INDEX: 34.01 KG/M2 | WEIGHT: 199.2 LBS | HEART RATE: 88 BPM | SYSTOLIC BLOOD PRESSURE: 145 MMHG | RESPIRATION RATE: 16 BRPM | DIASTOLIC BLOOD PRESSURE: 87 MMHG | OXYGEN SATURATION: 93 % | TEMPERATURE: 98.5 F | HEIGHT: 64 IN

## 2017-08-28 PROCEDURE — 74011250637 HC RX REV CODE- 250/637: Performed by: OBSTETRICS & GYNECOLOGY

## 2017-08-28 PROCEDURE — 90707 MMR VACCINE SC: CPT | Performed by: OBSTETRICS & GYNECOLOGY

## 2017-08-28 PROCEDURE — 74011250636 HC RX REV CODE- 250/636: Performed by: OBSTETRICS & GYNECOLOGY

## 2017-08-28 RX ORDER — IBUPROFEN 800 MG/1
800 TABLET ORAL EVERY 8 HOURS
Qty: 30 TAB | Refills: 0 | Status: SHIPPED | OUTPATIENT
Start: 2017-08-28 | End: 2020-10-14 | Stop reason: ALTCHOICE

## 2017-08-28 RX ORDER — OXYCODONE AND ACETAMINOPHEN 5; 325 MG/1; MG/1
1 TABLET ORAL
Qty: 10 TAB | Refills: 0 | Status: SHIPPED | OUTPATIENT
Start: 2017-08-28 | End: 2020-10-14 | Stop reason: ALTCHOICE

## 2017-08-28 RX ADMIN — MEASLES, MUMPS, AND RUBELLA VIRUS VACCINE LIVE 0.5 ML: 1000; 12500; 1000 INJECTION, POWDER, LYOPHILIZED, FOR SUSPENSION SUBCUTANEOUS at 11:25

## 2017-08-28 RX ADMIN — DOCUSATE SODIUM 100 MG: 100 CAPSULE, LIQUID FILLED ORAL at 10:38

## 2017-08-28 RX ADMIN — IBUPROFEN 800 MG: 400 TABLET, FILM COATED ORAL at 10:38

## 2017-08-28 RX ADMIN — IBUPROFEN 800 MG: 400 TABLET, FILM COATED ORAL at 01:51

## 2017-08-28 NOTE — ROUTINE PROCESS
Bedside SBAR received from Wes Pizarro RN      I have reviewed discharge instructions with the patient. The patient verbalized understanding.

## 2017-08-28 NOTE — DISCHARGE SUMMARY
Patient ID:  Brittney Salcedo  [id number]  45 y.o.  1985    Admit Date: 2017    Discharge Date: 2017     Admitting Physician: Tony Mukherjee DO  Attending Physician: Baltazar Connolly MD    Admission Diagnoses: Maternity  Labor without complication    Discharge Diagnoses: Same as above with  producing a viable infant. Information for the patient's :  Homero Blizzard [[id number]]   One Minute Apgar: 2 (Filed from Delivery Summary)  Five  Minute Apgar: 8 (Filed from Delivery Summary)       Maternal Labs:   Lab Results   Component Value Date/Time    HBsAg, External Negative 2017    HIV, External Negative 2017    Rubella, External Non-Immune 2017    RPR, External Non Reactive 2017    GrBStrep, External Negative 2017       Cord Blood Results:   Information for the patient's :  Homero Blizzard [[id number]]     Lab Results   Component Value Date/Time    ABO/Rh(D) Pama Hiader POSITIVE 2017 05:25 AM    MICHAEL IgG NEG 2017 05:25 AM    Bilirubin if MICHAEL pos: IF DIRECT SHADY POSITIVE, BILIRUBIN TO FOLLOW 2017 05:25 AM           History of Present Illness:   OB History      Para Term  AB Living    1 1 1   1    SAB TAB Ectopic Molar Multiple Live Births        0 1        Admitted for active labor. Hospital Course:   Patient was admitted as above and delivered via  . Please the chart for details. The postpartum course was unremarkable. She was deemed stable for discharge home on day 2. Follow-up:  She was instructed to follow-up with her obstetrician in 6 weeks.     Results of Postpartum Depression Screening:  EPDS  Hilbert  Depression Scale  I have been able to laugh and see the funny side of things: As much as I always could  I have looked forward with enjoyment to things: As much as I ever did  I have blamed myself unnecessarily when things went wrong: No, never  I have been anxious or worried for no good reason: No, not at all  I have felt scared or panicky for no very good reason: No, not at all  Things have been getting on top of me: No, I have been coping as well as ever  I have been so unhappy that I have had difficulty sleeping: No, not at all  I have felt sad or miserable: Not very often  I have been so unhappy that I have been crying: No, never  The thought of harming myself has occurred to me: Never  Total Score: 1          Signed:   Prasad Mota MD  8/28/2017  8:00 AM

## 2017-08-28 NOTE — PROGRESS NOTES
Post-Partum Day Number 2 Progress Note    Patient doing well post-partum without significant complaints. Voiding without difficulty, normal lochia. Vitals:  Patient Vitals for the past 24 hrs:   BP Temp Pulse Resp   17 0510 124/70 98.4 °F (36.9 °C) 80 16   17 2108 138/84 98 °F (36.7 °C) 89 16   17 1710 122/80 98.6 °F (37 °C) 98 16   17 0907 118/72 97.9 °F (36.6 °C) 72 14     Temp (24hrs), Av.2 °F (36.8 °C), Min:97.9 °F (36.6 °C), Max:98.6 °F (37 °C)      Vital signs stable, afebrile. Exam:  Patient without distress. Abdomen soft, fundus firm, nontender               Lower extremities- nontender with 2+ BLE edema; no cords    Labs: No results found for this or any previous visit (from the past 24 hour(s)). O+/RNI    Assessment and Plan:  Patient appears to be having uncomplicated post-partum course. Discharge today-instructions reviewed. O POSITIVE/RNI. MMR vax prior to d/c.

## 2017-08-28 NOTE — ROUTINE PROCESS
2000: Bedside and Verbal shift change report given to Jammie Pierre RN  (oncoming nurse) by RAMIRO Pool RN  (offgoing nurse). Report included the following information SBAR.

## 2017-08-28 NOTE — DISCHARGE INSTRUCTIONS
Discharge Instructions for Vaginal Delivery    Patient ID:  Brittney Salcdeo  [id number]  49 y.o.  1985    Take Home Medications   See medication list  Continue taking your prenatal vitamins if you are breastfeeding. Follow-up Appointment:  Follow-up with Dr. Hortencia Colindres in 6 weeks. Follow-up care is a key part of your treatment and safety. Be sure to make and go to all appointments, and call your doctor if you are having problems. Its also a good idea to know your test results and keep a list of the medicines you take. Activity  Avoid anything in your vagina for 6 weeks (no intercourse, tampons, or douching). You may drive unless you are taking prescription pain medications. Climbing stairs and light lifting are ok after a vaginal delivery unless your doctor tells you not to. You may gradually work up to exercise over the next few weeks, but take it easy- you'll be tired! Diet  You may eat a regular diet but you may want to avoid heavy, greasy foods and other foods that could increase constipation. Wound care  If you have stitches, continue to rinse with a squirt bottle of warm water each time you use the bathroom for about 2 weeks. Your stitches will gradually dissolve over several weeks. Sitz baths are also helpful to keep the wound clean, encourage healing, and to help with pain associated with the stitches or hemorrhoids. You can use either a sitz bath basin or a bathtub filled with 2-3\" inches of plain warm water. Soak for 10 minutes 3 times a day. Pain Management  If you were not given a prescription pain medication, you can take over the counter pain medicines like Tylenol (acetominophen), Advil or Motrin (ibuprofen). You can take acetominophen and ibuprofen together, alternating doses every few hours.   You will get the most relief if you take the maximum dose:  · Tylenol or acetominophen 1000 mg every 6 hours (equivalent to 2 Extra Strength Tylenols every 6 hours)  · Motrin or Advil (generic ibuprofen) 800 mg every 8 hours (4 tablets or capsules every 8 hours)  If you were given a prescription pain medication, you can take ibuprofen along with it (doses as above), but not Tylenol. Use ibuprofen as the main medication, and take the prescription medication if needed for more severe pain not relieved by the ibuprofen. Your goal should be to take only the minimum necessary amounts of the prescription medication (narcotic), as these pain medicines can be habit-forming and will worsen or cause constipation. Most patients will find that within a couple of days, their pain is adequately controlled using only over-the-counter medications. A heating pad can also be very helpful for cramping or back pain. Over-the-counter hemorrhoid wipes and ointments are fine to use if you have hemorrhoids. Constipation  You may find that bowel movements are irregular after delivery and that you have a tendency to be constipated. If you have stitches (and especially if you had a more severe tear called a third- or fourth-degree), your bowel movements will be more comfortable if they are soft. A stool softener such as Colace (docusate) can safely be taken daily if needed. If you become constipated you can use a laxative such as Dulcolax, Miralax or Milk of Magnesia. Don't wait until constipation is severe- take something sooner rather than later and you will feel much better! Your Recovery: What to Expect at Home  Delivering a baby is hard work and you probably aren't getting much sleep, so you will certainly be tired. Try to rest when you can and don't worry about doing housework or other tasks which can wait. If you're experiencing soreness or swelling in your bottom, this should improve over the next few days to 2 weeks. You are likely to have some back pain or general body aches or muscle soreness. This should improve with acetominophen or ibuprofen.   If your legs were swollen during your pregnancy or as a result of IV fluids given during your hospital stay, this should go away in a few days to a week. Most women experience some form of the \"Baby Blues\" after having a baby. This means that you may feel emotional, tearful, frustrated, anxious, sad, and irritable some of the time. This is normal if it's not severe and should go away after about 2 weeks. Getting as much rest as you can will help. Accept assistance from friends and family members so that you can take breaks from caring for the baby. Call your doctor if your symptoms seem severe, last more than 2 weeks, or seem to be getting worse instead of better. Get help immediately if you have thoughts of wanting to hurt yourself or others! When should you call for help? Call 911 anytime you think you may need emergency care. For example, call if:  You pass out (lose consciousness). You have sudden chest pain and shortness of breath, or you cough up blood. You have severe pain in your belly. Call your doctor now or seek immediate medical care if:  You have heavy vaginal bleeding that soaks one or more pads in an hour, or you have large clots (golf ball size or larger). Your have foul-smelling discharge from your vagina. You are sick to your stomach or cannot keep fluids down. You have pain that does not get better after you take pain medicine. You have signs of infection, such as: Increased pain in your abdomen or vaginal area  Red streaks, warmth, or tenderness of your breasts. A fever of 101 or greater (taken by mouth). You have signs of a blood clot, such as:  Pain in your calf, back of knee, thigh, or groin. Redness and swelling in your leg or groin. You have trouble passing urine or stool, especially if you have pain or swelling in your lower belly; or if you are unable to have a bowel movement after taking a laxative. You have a fast or pounding heartbeat.

## 2019-12-12 NOTE — PROCEDURES
- HeartMate 3 Implanted 9/10/19 as DT.  - Implanted by Dr. Walden  - INR supra theraputic.   - per Dr Lua will d/c coumadin, let drift down and bridge with heparin in preparation for OHTx  - Antiplatelets:  mg.  - LDH is stable Will monitor daily.   - Speed set at 5100  - Epi @ .02, NO @ 20.   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 Urgent selection 11/26, approved for transplant. Listed tier 2.    Procedure: Device Interrogation Including analysis of device parameters  Current Settings: Ventricular Assist Device  Review of device function is stable    TXP LVAD INTERROGATIONS 12/12/2019 12/12/2019 12/12/2019 12/12/2019 12/12/2019 12/12/2019 12/12/2019   Type HeartMate3 HeartMate3 HeartMate3 HeartMate3 HeartMate3 HeartMate3 HeartMate3   Flow 3.3 3.5 3.0 3.8 4.0 4.2 4.4   Speed 5100 5100 5100 5100 5100 5100 5100   PI 6.0 5.3 6.7 4.9 3.5 3.0 2.8   Power ([place]) 3.4 3.5 3.4 3.5 3.5 3.5 3.6   LSL 4700 4700 4700 4700 4700 4700 4700   Pulsatility Intermittent pulse Intermittent pulse Intermittent pulse Intermittent pulse Intermittent pulse Intermittent pulse Intermittent pulse      [address]  *** FINAL REPORT ***    Name: Fanny Parker  MRN: [id number]    Outpatient  : 29 Dec 1985  HIS Order #: [id number]  [address] [facility] Visit #: [id number]  Date: 2017    TYPE OF TEST: Peripheral Venous Testing    REASON FOR TEST  Edema, leg    Left Leg:-  Deep venous thrombosis:           No  Superficial venous thrombosis:    No  Deep venous insufficiency:        Not examined  Superficial venous insufficiency: Not examined      INTERPRETATION/FINDINGS  PROCEDURE:  Color duplex ultrasound imaging of lower extremity veins. FINDINGS:       Right: The common femoral vein is patent and without evidence of  thrombus. Phasic flow is observed. This extremity was not otherwise  evaluated. Left:   The common femoral, deep femoral, femoral, popliteal,  posterior tibial, peroneal, and great saphenous are patent and without   evidence of thrombus;  each is fully compressible and there is no  narrowing of the flow channel on color Doppler imaging. Phasic flow  is observed in the common femoral vein. IMPRESSION:  No evidence of left lower extremity vein thrombosis. ADDITIONAL COMMENTS    I have personally reviewed the data relevant to the interpretation of  this  study.     TECHNOLOGIST: Viviana Ford RVT  Signed: 2017 05:10 PM    PHYSICIAN: Micki Jeffery MD  Signed: 2017 02:53 PM

## 2020-10-14 ENCOUNTER — OFFICE VISIT (OUTPATIENT)
Dept: INTERNAL MEDICINE CLINIC | Age: 35
End: 2020-10-14
Payer: COMMERCIAL

## 2020-10-14 VITALS
WEIGHT: 200 LBS | BODY MASS INDEX: 34.15 KG/M2 | HEIGHT: 64 IN | TEMPERATURE: 98 F | OXYGEN SATURATION: 96 % | RESPIRATION RATE: 14 BRPM | SYSTOLIC BLOOD PRESSURE: 124 MMHG | DIASTOLIC BLOOD PRESSURE: 84 MMHG | HEART RATE: 96 BPM

## 2020-10-14 DIAGNOSIS — Z23 ENCOUNTER FOR IMMUNIZATION: ICD-10-CM

## 2020-10-14 DIAGNOSIS — S39.012A LUMBAR STRAIN, INITIAL ENCOUNTER: Primary | ICD-10-CM

## 2020-10-14 DIAGNOSIS — R22.0 JAW SWELLING: ICD-10-CM

## 2020-10-14 DIAGNOSIS — S61.012D LACERATION OF LEFT THUMB WITHOUT FOREIGN BODY WITHOUT DAMAGE TO NAIL, SUBSEQUENT ENCOUNTER: ICD-10-CM

## 2020-10-14 PROCEDURE — 99204 OFFICE O/P NEW MOD 45 MIN: CPT | Performed by: INTERNAL MEDICINE

## 2020-10-14 RX ORDER — METHOCARBAMOL 500 MG/1
500 TABLET, FILM COATED ORAL
Qty: 30 TAB | Refills: 0 | Status: SHIPPED | OUTPATIENT
Start: 2020-10-14 | End: 2022-07-20

## 2020-10-14 NOTE — PROGRESS NOTES
HPI:  Cristina Greene is a 36y.o. year old female who is here for a routine visit:    Presents as a new patient after more than a 3-year absence. She suffered a laceration to her left thumb 8 days ago requiring 4 sutures being placed. She is here to have those removed. They are healing nicely and she has been cleaning them with soap and water. She did not get a tetanus booster but apparently got 1 with the birth of her child 3 years ago. She has also noted 3-year history since the birth of her child of lower back pain. It seems to be mostly with movement but hurts most of the time. The pain does not radiate to the legs. There is no numbness, tingling, or weakness. No bowel or bladder change. She does get some relief with ibuprofen. She is also had some intermittent tenderness and swelling along the left jawline just below the ear. She has a longstanding history of goiter and is seeing endocrinology for that. She did have an ultrasound in the last year. Past Medical History:   Diagnosis Date    Herpes simplex virus (HSV) infection     Cold sores- winter 2016    Other ill-defined conditions(799.89)     IBS; thyroid issues    Thyroid activity decreased     Mildly decreased TSH, mildly elevated T3, no current treatment       Past Surgical History:   Procedure Laterality Date    HX WISDOM TEETH EXTRACTION         Prior to Admission medications    Medication Sig Start Date End Date Taking? Authorizing Provider   methocarbamoL (ROBAXIN) 500 mg tablet Take 1 Tab by mouth three (3) times daily as needed for Muscle Spasm(s). 10/14/20  Yes Aleja Peterson MD   ibuprofen (MOTRIN) 800 mg tablet Take 1 Tab by mouth every eight (8) hours. 8/28/17 10/14/20  Darlene Wolf MD   oxyCODONE-acetaminophen (PERCOCET) 5-325 mg per tablet Take 1 Tab by mouth every four (4) hours as needed. Max Daily Amount: 6 Tabs.  8/28/17 10/14/20  Darlene Wolf MD   prenatal vit-iron fumarate-fa (RIGHT STEP PRENATAL VITAMINS) 27 mg iron- 0.8 mg tab tablet Take 1 Tab by mouth daily. 10/14/20  Provider, Historical       Social History     Socioeconomic History    Marital status: SINGLE     Spouse name: Not on file    Number of children: Not on file    Years of education: Not on file    Highest education level: Not on file   Occupational History    Not on file   Social Needs    Financial resource strain: Not on file    Food insecurity     Worry: Not on file     Inability: Not on file    Transportation needs     Medical: Not on file     Non-medical: Not on file   Tobacco Use    Smoking status: Current Every Day Smoker     Packs/day: 0.25     Years: 7.00     Pack years: 1.75     Types: Cigarettes     Last attempt to quit: 12/28/2016     Years since quitting: 3.7    Smokeless tobacco: Never Used   Substance and Sexual Activity    Alcohol use:  Yes     Alcohol/week: 1.0 - 2.0 standard drinks     Types: 1 - 2 Standard drinks or equivalent per week    Drug use: No    Sexual activity: Yes     Birth control/protection: Pill, None   Lifestyle    Physical activity     Days per week: Not on file     Minutes per session: Not on file    Stress: Not on file   Relationships    Social connections     Talks on phone: Not on file     Gets together: Not on file     Attends Zoroastrianism service: Not on file     Active member of club or organization: Not on file     Attends meetings of clubs or organizations: Not on file     Relationship status: Not on file    Intimate partner violence     Fear of current or ex partner: Not on file     Emotionally abused: Not on file     Physically abused: Not on file     Forced sexual activity: Not on file   Other Topics Concern    Not on file   Social History Narrative    Not on file          ROS  Per HPI    Visit Vitals  /84   Pulse 96   Temp 98 °F (36.7 °C) (Temporal)   Resp 14   Ht 5' 4\" (1.626 m)   Wt 200 lb (90.7 kg)   LMP 09/24/2020   SpO2 96%   BMI 34.33 kg/m²         Physical Exam   Physical Examination: General appearance - alert, well appearing, and in no distress  Ears - bilateral TM's and external ear canals normal  Mouth - mucous membranes moist, pharynx normal without lesions and there is a small amount of tenderness just below the left jawline. No discrete mass or nodule. No adenopathy. Neck - supple, no significant adenopathy  Lymphatics - no palpable lymphadenopathy, no hepatosplenomegaly  Chest - clear to auscultation, no wheezes, rales or rhonchi, symmetric air entry  Heart - normal rate, regular rhythm, normal S1, S2, no murmurs, rubs, clicks or gallops  Abdomen - soft, nontender, nondistended, no masses or organomegaly  Back exam - pain with motion noted during exam, tenderness noted along the lower back muscles, sacroiliac joints and sciatic notches nontender, negative straight-leg raise bilaterally at 45 degrees, normal reflexes and strength bilateral lower extremities  Neurological - alert, oriented, normal speech, no focal findings or movement disorder noted  Musculoskeletal - no joint tenderness, deformity or swelling  Extremities - peripheral pulses normal, no pedal edema, no clubbing or cyanosis  4 stitches in her left distal thumb. These were easily removed without difficulty today. There was some slight separation of the wound prior to the removal and this was Steri-Stripped in place. Assessment/Plan:  Diagnoses and all orders for this visit:    1. Lumbar strain, initial encounter-with chronic muscle pain. Will treat with stretching exercises, muscle relaxers, and consider physical therapy if the symptoms persist.    2. Encounter for immunization    3. Laceration of left thumb without foreign body without damage to nail, subsequent encounter-Steri-Strips in place. She will continue to do wound care as able and follow-up if there is signs of infection.  -     REMOVAL OF SUTURES    4. Jaw swelling-? Intermittent sialadenitis.   She will monitor and see ENT if the symptoms persist or worsen. Other orders  -     methocarbamoL (ROBAXIN) 500 mg tablet; Take 1 Tab by mouth three (3) times daily as needed for Muscle Spasm(s). Follow-up and Dispositions    · Return if symptoms worsen or fail to improve. Advised her to call back or return to office if symptoms worsen/change/persist.  Discussed expected course/resolution/complications of diagnosis in detail with patient. Medication risks/benefits/costs/interactions/alternatives discussed with patient. She was given an after visit summary which includes diagnoses, current medications, & vitals. She expressed understanding with the diagnosis and plan.

## 2020-10-14 NOTE — PATIENT INSTRUCTIONS

## 2022-07-20 ENCOUNTER — OFFICE VISIT (OUTPATIENT)
Dept: INTERNAL MEDICINE CLINIC | Age: 37
End: 2022-07-20
Payer: COMMERCIAL

## 2022-07-20 VITALS
TEMPERATURE: 97.9 F | WEIGHT: 202.2 LBS | OXYGEN SATURATION: 99 % | HEIGHT: 65 IN | SYSTOLIC BLOOD PRESSURE: 126 MMHG | HEART RATE: 104 BPM | BODY MASS INDEX: 33.69 KG/M2 | RESPIRATION RATE: 16 BRPM | DIASTOLIC BLOOD PRESSURE: 90 MMHG

## 2022-07-20 DIAGNOSIS — F41.0 PANIC DISORDER: ICD-10-CM

## 2022-07-20 DIAGNOSIS — F41.9 ANXIETY: ICD-10-CM

## 2022-07-20 DIAGNOSIS — F41.0 PANIC ATTACK: Primary | ICD-10-CM

## 2022-07-20 PROCEDURE — 99213 OFFICE O/P EST LOW 20 MIN: CPT | Performed by: NURSE PRACTITIONER

## 2022-07-20 RX ORDER — ALPRAZOLAM 0.25 MG/1
0.25 TABLET ORAL
Qty: 12 TABLET | Refills: 0 | Status: SHIPPED | OUTPATIENT
Start: 2022-07-20 | End: 2022-09-02 | Stop reason: SDUPTHER

## 2022-07-20 RX ORDER — SERTRALINE HYDROCHLORIDE 50 MG/1
50 TABLET, FILM COATED ORAL DAILY
Qty: 30 TABLET | Refills: 0 | Status: SHIPPED | OUTPATIENT
Start: 2022-07-20 | End: 2022-08-16

## 2022-07-20 NOTE — PROGRESS NOTES
HISTORY OF PRESENT ILLNESS  Ha Burroughs is a 29 y.o. female. Patient reports worsening anxiety with panic attacks at least 3 times monthly. Panic attacks come on suddenly. They typically last a few hours.  helps to calm her down and is very supportive. He is with her today. During panic attacks, she experiences chest pain radiation to left arm, nausea, numbness and tingling of upper extremities, elevated blood pressure, tremors, hands shaking, brain fog, difficulty breathing, palpitations, and fatigue. She feels ongoing fatigue after they resolve. She had appointment with endocrine this week to have thyroid levels checked. 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 She repeats that much of anxiety is centered around fear. Denies depression or suicidal thoughts. She denies history of anxiety but then admits she was on a medication in high school briefly, possibly zoloft. She is not sure why she took it, but believed it had poor side effects for her, but they are unknown also. She is not sure if it was zoloft. Visit Vitals  BP (!) 126/90 (BP 1 Location: Right arm, BP Patient Position: Sitting, BP Cuff Size: Adult long)   Pulse (!) 104   Temp 97.9 °F (36.6 °C) (Temporal)   Resp 16   Ht 5' 5\" (1.651 m)   Wt 202 lb 3.2 oz (91.7 kg)   LMP 07/16/2022 (Exact Date)   SpO2 99%   BMI 33.65 kg/m²       HPI    Review of Systems   Psychiatric/Behavioral:  The patient is nervous/anxious. Physical Exam  Constitutional:       Appearance: Normal appearance. Cardiovascular:      Rate and Rhythm: Tachycardia present. Heart sounds: Normal heart sounds. Pulmonary:      Effort: Pulmonary effort is normal.   Neurological:      General: No focal deficit present. Mental Status: She is alert and oriented to person, place, and time.    Psychiatric: Attention and Perception: Attention normal.         Mood and Affect: Mood is anxious. Affect is tearful (crying throughout visit). Speech: Speech normal.         Behavior: Behavior is cooperative. Thought Content: Thought content does not include homicidal or suicidal ideation. Cognition and Memory: Cognition and memory normal.      Comments: Hands shaking during exam       ASSESSMENT and PLAN    ICD-10-CM ICD-9-CM    1. Panic attack  F41.0 300.01 ALPRAZolam (XANAX) 0.25 mg tablet      2. Panic disorder  F41.0 300.01       3.  Anxiety  F41.9 300.00       Xanax PRN  Zoloft 50 mg  Follow-up in 2 weeks  [facility] advised

## 2022-08-16 RX ORDER — SERTRALINE HYDROCHLORIDE 50 MG/1
TABLET, FILM COATED ORAL
Qty: 30 TABLET | Refills: 0 | Status: SHIPPED | OUTPATIENT
Start: 2022-08-16 | End: 2022-09-02 | Stop reason: SDUPTHER

## 2022-09-02 ENCOUNTER — OFFICE VISIT (OUTPATIENT)
Dept: INTERNAL MEDICINE CLINIC | Age: 37
End: 2022-09-02
Payer: COMMERCIAL

## 2022-09-02 VITALS
DIASTOLIC BLOOD PRESSURE: 88 MMHG | HEART RATE: 99 BPM | RESPIRATION RATE: 16 BRPM | HEIGHT: 65 IN | OXYGEN SATURATION: 96 % | WEIGHT: 201 LBS | BODY MASS INDEX: 33.49 KG/M2 | TEMPERATURE: 97.6 F | SYSTOLIC BLOOD PRESSURE: 144 MMHG

## 2022-09-02 DIAGNOSIS — F41.0 PANIC ATTACK: ICD-10-CM

## 2022-09-02 DIAGNOSIS — S39.012A LUMBAR STRAIN, INITIAL ENCOUNTER: ICD-10-CM

## 2022-09-02 DIAGNOSIS — Z00.00 ROUTINE GENERAL MEDICAL EXAMINATION AT A HEALTH CARE FACILITY: Primary | ICD-10-CM

## 2022-09-02 PROCEDURE — 99395 PREV VISIT EST AGE 18-39: CPT | Performed by: INTERNAL MEDICINE

## 2022-09-02 RX ORDER — CYCLOBENZAPRINE HCL 10 MG
5-10 TABLET ORAL
Qty: 30 TABLET | Refills: 1 | Status: SHIPPED | OUTPATIENT
Start: 2022-09-02

## 2022-09-02 RX ORDER — ALPRAZOLAM 0.25 MG/1
0.25 TABLET ORAL
Qty: 20 TABLET | Refills: 0 | Status: SHIPPED | OUTPATIENT
Start: 2022-09-02

## 2022-09-02 RX ORDER — SERTRALINE HYDROCHLORIDE 50 MG/1
50 TABLET, FILM COATED ORAL DAILY
Qty: 90 TABLET | Refills: 1 | Status: SHIPPED | OUTPATIENT
Start: 2022-09-02

## 2022-09-02 NOTE — PATIENT INSTRUCTIONS
Low Back Pain: Exercises  Introduction  Here are some examples of exercises for you to try. The exercises may be suggested for a condition or for rehabilitation. Start each exercise slowly. Ease off the exercises if you start to have pain. You will be told when to start these exercises and which ones will work best for you. How to do the exercises  Press-up    Lie on your stomach, supporting your body with your forearms. Press your elbows down into the floor to raise your upper back. As you do this, relax your stomach muscles and allow your back to arch without using your back muscles. As your press up, do not let your hips or pelvis come off the floor. Hold for 15 to 30 seconds, then relax. Repeat 2 to 4 times. Alternate arm and leg (bird dog) exercise    Do this exercise slowly. Try to keep your body straight at all times, and do not let one hip drop lower than the other. Start on the floor, on your hands and knees. Tighten your belly muscles. Raise one leg off the floor, and hold it straight out behind you. Be careful not to let your hip drop down, because that will twist your trunk. Hold for about 6 seconds, then lower your leg and switch to the other leg. Repeat 8 to 12 times on each leg. Over time, work up to holding for 10 to 30 seconds each time. If you feel stable and secure with your leg raised, try raising the opposite arm straight out in front of you at the same time. Knee-to-chest exercise    Lie on your back with your knees bent and your feet flat on the floor. Bring one knee to your chest, keeping the other foot flat on the floor (or keeping the other leg straight, whichever feels better on your lower back). Keep your lower back pressed to the floor. Hold for at least 15 to 30 seconds. Relax, and lower the knee to the starting position. Repeat with the other leg. Repeat 2 to 4 times with each leg.   To get more stretch, put your other leg flat on the floor while pulling your knee to your chest.  Curl-ups    Lie on the floor on your back with your knees bent at a 90-degree angle. Your feet should be flat on the floor, about 12 inches from your buttocks. Cross your arms over your chest. If this bothers your neck, try putting your hands behind your neck (not your head), with your elbows spread apart. Slowly tighten your belly muscles and raise your shoulder blades off the floor. Keep your head in line with your body, and do not press your chin to your chest.  Hold this position for 1 or 2 seconds, then slowly lower yourself back down to the floor. Repeat 8 to 12 times. Pelvic tilt exercise    Lie on your back with your knees bent. \"Brace\" your stomach. This means to tighten your muscles by pulling in and imagining your belly button moving toward your spine. You should feel like your back is pressing to the floor and your hips and pelvis are rocking back. Hold for about 6 seconds while you breathe smoothly. Repeat 8 to 12 times. Heel dig bridging    Lie on your back with both knees bent and your ankles bent so that only your heels are digging into the floor. Your knees should be bent about 90 degrees. Then push your heels into the floor, squeeze your buttocks, and lift your hips off the floor until your shoulders, hips, and knees are all in a straight line. Hold for about 6 seconds as you continue to breathe normally, and then slowly lower your hips back down to the floor and rest for up to 10 seconds. Do 8 to 12 repetitions. Hamstring stretch in doorway    Lie on your back in a doorway, with one leg through the open door. Slide your leg up the wall to straighten your knee. You should feel a gentle stretch down the back of your leg. Hold the stretch for at least 15 to 30 seconds. Do not arch your back, point your toes, or bend either knee. Keep one heel touching the floor and the other heel touching the wall. Repeat with your other leg. Do 2 to 4 times for each leg.   Hip flexor stretch    Kneel on the floor with one knee bent and one leg behind you. Place your forward knee over your foot. Keep your other knee touching the floor. Slowly push your hips forward until you feel a stretch in the upper thigh of your rear leg. Hold the stretch for at least 15 to 30 seconds. Repeat with your other leg. Do 2 to 4 times on each side. Wall sit    Stand with your back 10 to 12 inches away from a wall. Lean into the wall until your back is flat against it. Slowly slide down until your knees are slightly bent, pressing your lower back into the wall. Hold for about 6 seconds, then slide back up the wall. Repeat 8 to 12 times. Follow-up care is a key part of your treatment and safety. Be sure to make and go to all appointments, and call your doctor if you are having problems. It's also a good idea to know your test results and keep a list of the medicines you take. Where can you learn more? Go to http://www.gray.com/  Enter L105 in the search box to learn more about \"Low Back Pain: Exercises. \"  Current as of: July 1, 2021               Content Version: 13.2  © 2006-2022 [employer]. Care instructions adapted under license by [employer] (which disclaims liability or warranty for this information). If you have questions about a medical condition or this instruction, always ask your healthcare professional. [address] any warranty or liability for your use of this information.

## 2022-09-03 NOTE — PROGRESS NOTES
Subjective:   29 y.o. female for Well Woman Check. Her gyne and breast care is done elsewhere by her Ob-Gyne physician. Past Medical History:   Diagnosis Date    Herpes simplex virus (HSV) infection     Cold sores- winter 2016    Other ill-defined conditions(799.89)     IBS; thyroid issues    Thyroid activity decreased     Mildly decreased TSH, mildly elevated T3, no current treatment     Past Surgical History:   Procedure Laterality Date    HX WISDOM TEETH EXTRACTION       Current Outpatient Medications on File Prior to Visit   Medication Sig Dispense Refill    [DISCONTINUED] sertraline (ZOLOFT) 50 mg tablet TAKE 1 TABLET BY MOUTH EVERY DAY IN THE MORNING 30 Tablet 0    [DISCONTINUED] ALPRAZolam (XANAX) 0.25 mg tablet Take 1 Tablet by mouth two (2) times daily as needed for Anxiety. Max Daily Amount: 0.5 mg. 12 Tablet 0     No current facility-administered medications on file prior to visit. Social History     Socioeconomic History    Marital status: SINGLE     Spouse name: Not on file    Number of children: Not on file    Years of education: Not on file    Highest education level: Not on file   Occupational History    Not on file   Tobacco Use    Smoking status: Every Day     Packs/day: 0.25     Years: 7.00     Pack years: 1.75     Types: Cigarettes     Last attempt to quit: 2016     Years since quittin.6    Smokeless tobacco: Never   Vaping Use    Vaping Use: Never used   Substance and Sexual Activity    Alcohol use:  Yes     Alcohol/week: 1.0 - 2.0 standard drink     Types: 1 - 2 Standard drinks or equivalent per week    Drug use: No    Sexual activity: Yes     Birth control/protection: Pill, None   Other Topics Concern    Not on file   Social History Narrative    Not on file     Social Determinants of Health     Financial Resource Strain: Not on file   Food Insecurity: Not on file   Transportation Needs: Not on file   Physical Activity: Not on file   Stress: Not on file Social Connections: Not on file   Intimate Partner Violence: Not on file   Housing Stability: Not on file     Social History     Socioeconomic History    Marital status: SINGLE     Spouse name: Not on file    Number of children: Not on file    Years of education: Not on file    Highest education level: Not on file   Occupational History    Not on file   Tobacco Use    Smoking status: Every Day     Packs/day: 0.25     Years: 7.00     Pack years: 1.75     Types: Cigarettes     Last attempt to quit: 2016     Years since quittin.6    Smokeless tobacco: Never   Vaping Use    Vaping Use: Never used   Substance and Sexual Activity    Alcohol use: Yes     Alcohol/week: 1.0 - 2.0 standard drink     Types: 1 - 2 Standard drinks or equivalent per week    Drug use: No    Sexual activity: Yes     Birth control/protection: Pill, None   Other Topics Concern    Not on file   Social History Narrative    Not on file     Social Determinants of Health     Financial Resource Strain: Not on file   Food Insecurity: Not on file   Transportation Needs: Not on file   Physical Activity: Not on file   Stress: Not on file   Social Connections: Not on file   Intimate Partner Violence: Not on file   Housing Stability: Not on file         Lab Results   Component Value Date/Time    WBC 16.5 (H) 2017 01:15 PM    HGB 12.1 2017 01:15 PM    HCT 37.4 2017 01:15 PM    PLATELET 265  01:15 PM    MCV 83.9 2017 01:15 PM     Lab Results   Component Value Date/Time    Cholesterol, total 162 2012 12:00 AM    HDL Cholesterol 51 2012 12:00 AM    LDL, calculated 89 2012 12:00 AM    Triglyceride 109 2012 12:00 AM     Lab Results   Component Value Date/Time    ALT (SGPT) 32 2012 05:55 AM    Alk.  phosphatase 111 2012 05:55 AM    Bilirubin, total 0.4 2012 05:55 AM    Albumin 3.4 (L) 2012 05:55 AM    Protein, total 7.1 2012 05:55 AM    PLATELET 716 08/25/2017 01:15 PM       Lab Results   Component Value Date/Time    GFR est non-AA >60 03/21/2012 05:55 AM    GFR est AA >60 03/21/2012 05:55 AM    Creatinine 0.7 03/21/2012 05:55 AM    BUN 10 03/21/2012 05:55 AM    Sodium 138 03/21/2012 05:55 AM    Potassium 4.3 03/21/2012 05:55 AM    Chloride 105 03/21/2012 05:55 AM    CO2 26 03/21/2012 05:55 AM     Lab Results   Component Value Date/Time    TSH 0.657 11/17/2012 12:00 AM    Triiodothyronine (T3), free 3.4 11/17/2012 12:00 AM    T4, Free 1.05 11/17/2012 12:00 AM      Lab Results   Component Value Date/Time    Glucose 103 (H) 03/21/2012 05:55 AM         Specific concerns today: Anxiety is under much better control now. She has had only 1 panic attack in the last 2 to 3 weeks. She does get some benefit from taking the Xanax. She has ongoing issues with lower back pain and left flank pain. Its been going on for years and did previously respond to using a muscle relaxer. No radicular pain to the legs. No numbness, tingling, or weakness. She just saw the endocrinologist for follow-up of her goiter and labs for thyroid were normal..    Review of Systems  A comprehensive review of systems was negative except for that written in the HPI. Objective:   Blood pressure (!) 144/88, pulse 99, temperature 97.6 °F (36.4 °C), temperature source Temporal, resp. rate 16, height 5' 4.5\" (1.638 m), weight 201 lb (91.2 kg), last menstrual period 08/19/2022, SpO2 96 %, currently breastfeeding.    Physical Examination:   General appearance - alert, well appearing, and in no distress  Ears - bilateral TM's and external ear canals normal  Nose - normal and patent, no erythema, discharge or polyps  Mouth - mucous membranes moist, pharynx normal without lesions  Neck - supple, no significant adenopathy  Lymphatics - no palpable lymphadenopathy, no hepatosplenomegaly  Chest - clear to auscultation, no wheezes, rales or rhonchi, symmetric air entry  Heart - normal rate, regular rhythm, normal S1, S2, no murmurs, rubs, clicks or gallops  Abdomen - soft, nontender, nondistended, no masses or organomegaly  Back exam - pain with motion noted during exam, mild tenderness across the left lower back muscles. Negative straight leg raising. Normal strength in the legs. Neurological - alert, oriented, normal speech, no focal findings or movement disorder noted  Musculoskeletal - no joint tenderness, deformity or swelling  Extremities - peripheral pulses normal, no pedal edema, no clubbing or cyanosis     Assessment/Plan:     lose weight, increase physical activity, bring BP log to office visit, follow low fat diet, follow low salt diet, routine labs ordered  Diagnoses and all orders for this visit:    1. Routine general medical examination at a health care facility  -     HEPATITIS C AB; Future  -     REFERRAL TO OBSTETRICS AND GYNECOLOGY  -     METABOLIC PANEL, COMPREHENSIVE; Future  -     CBC WITH AUTOMATED DIFF; Future  -     LIPID PANEL; Future    2. Panic attack-under better control. We did discuss increasing sertraline and she wishes to keep it the same for now. Continue Xanax as needed. -     ALPRAZolam (XANAX) 0.25 mg tablet; Take 1 Tablet by mouth two (2) times daily as needed for Anxiety. Max Daily Amount: 0.5 mg.  -     sertraline (ZOLOFT) 50 mg tablet; Take 1 Tablet by mouth daily. 3. Lumbar strain, initial encounter-chronic. We will try adding muscle relaxers again. Stretching exercises. Physical therapy if symptoms persist.    Other orders  -     cyclobenzaprine (FLEXERIL) 10 mg tablet; Take 0.5-1 Tablets by mouth three (3) times daily as needed for Muscle Spasm(s).

## 2023-03-07 DIAGNOSIS — F41.0 PANIC ATTACK: ICD-10-CM

## 2023-03-07 RX ORDER — SERTRALINE HYDROCHLORIDE 50 MG/1
TABLET, FILM COATED ORAL
Qty: 90 TABLET | Refills: 1 | Status: SHIPPED | OUTPATIENT
Start: 2023-03-07

## 2023-08-30 DIAGNOSIS — F41.0 PANIC DISORDER (EPISODIC PAROXYSMAL ANXIETY): ICD-10-CM

## 2023-11-28 DIAGNOSIS — F41.0 PANIC DISORDER (EPISODIC PAROXYSMAL ANXIETY): ICD-10-CM

## 2023-12-26 ENCOUNTER — OFFICE VISIT (OUTPATIENT)
Age: 38
End: 2023-12-26
Payer: COMMERCIAL

## 2023-12-26 VITALS
BODY MASS INDEX: 36.12 KG/M2 | WEIGHT: 211.6 LBS | DIASTOLIC BLOOD PRESSURE: 82 MMHG | TEMPERATURE: 97.1 F | HEIGHT: 64 IN | SYSTOLIC BLOOD PRESSURE: 138 MMHG | HEART RATE: 100 BPM | OXYGEN SATURATION: 96 % | RESPIRATION RATE: 18 BRPM

## 2023-12-26 DIAGNOSIS — F41.1 GENERALIZED ANXIETY DISORDER: ICD-10-CM

## 2023-12-26 DIAGNOSIS — Z00.00 ENCOUNTER FOR WELL ADULT EXAM WITHOUT ABNORMAL FINDINGS: Primary | ICD-10-CM

## 2023-12-26 PROCEDURE — 99395 PREV VISIT EST AGE 18-39: CPT | Performed by: INTERNAL MEDICINE

## 2023-12-26 RX ORDER — ALPRAZOLAM 0.25 MG/1
0.25 TABLET ORAL 2 TIMES DAILY PRN
Qty: 20 TABLET | Refills: 1 | Status: SHIPPED | OUTPATIENT
Start: 2023-12-26 | End: 2024-01-25

## 2023-12-26 NOTE — PROGRESS NOTES
Well Adult Note  Name: Bethany Mac Date: 2023   MRN: [id number] Sex: Female   Age: 40 y.o. Ethnicity: Non- / Non    : 1985 Race: White (non-)      Cordelia Youssef is here for well adult exam.  History:  Presents for wellness exam and follow-up. Her weight is up slightly. She is currently not exercising. She does have some ongoing anxiety but much improved on sertraline. She is rarely taking the Xanax. She has lost her nephew in the last few months and that it has been very stressful. Review of Systems  Per HPI. Some lower back pain off and on with standing. No Known Allergies      Prior to Visit Medications    Medication Sig Taking? Authorizing Provider   sertraline (ZOLOFT) 50 MG tablet TAKE 1 TABLET BY MOUTH EVERY DAY Yes Chintan Sawyer MD   ALPRAZolam [facility]) 0.25 MG tablet Take 1 tablet by mouth 2 times daily as needed.  Yes Automatic Reconciliation, Ar   cyclobenzaprine (FLEXERIL) 10 MG tablet Take 0.5-1 tablets by mouth 3 times daily as needed Yes Automatic Reconciliation, Ar         Past Medical History:   Diagnosis Date    Anxiety     Herpes simplex virus (HSV) infection     Cold sores- winter 2016    Other ill-defined conditions(049.84)     IBS; thyroid issues    Thyroid activity decreased     Mildly decreased TSH, mildly elevated T3, no current treatment       Past Surgical History:   Procedure Laterality Date    WISDOM TOOTH EXTRACTION           Family History   Problem Relation Age of Onset    Hypertension Father     Diabetes Father     Cancer Paternal Grandmother         unknown    Heart Disease Maternal Grandfather     Heart Disease Maternal Grandmother     No Known Problems Brother     Cancer Paternal Grandfather         unknown    No Known Problems Mother     Diabetes Paternal Uncle        Social History     Tobacco Use    Smoking status: Every Day     Current packs/day: 0.00     Average packs/day: 0.3 packs/day for 20.0 years (5.0 ttl pk-yrs)

## 2024-02-19 DIAGNOSIS — F41.0 PANIC DISORDER (EPISODIC PAROXYSMAL ANXIETY): ICD-10-CM

## 2024-02-19 NOTE — TELEPHONE ENCOUNTER
Chief Complaint   Patient presents with    Medication Refill     Last Appointment with Dr. Matthew Yuen:  12/26/2023   No future appointments.

## 2024-05-17 DIAGNOSIS — F41.0 PANIC DISORDER (EPISODIC PAROXYSMAL ANXIETY): ICD-10-CM

## 2024-08-11 DIAGNOSIS — F41.0 PANIC DISORDER (EPISODIC PAROXYSMAL ANXIETY): ICD-10-CM

## 2024-11-09 DIAGNOSIS — F41.0 PANIC DISORDER (EPISODIC PAROXYSMAL ANXIETY): ICD-10-CM

## 2024-12-08 ENCOUNTER — HOSPITAL ENCOUNTER (EMERGENCY)
Facility: HOSPITAL | Age: 39
Discharge: HOME | End: 2024-12-08
Attending: EMERGENCY MEDICINE
Payer: COMMERCIAL

## 2024-12-08 VITALS
TEMPERATURE: 98.2 F | HEART RATE: 57 BPM | OXYGEN SATURATION: 99 % | BODY MASS INDEX: 36.13 KG/M2 | WEIGHT: 211.64 LBS | HEIGHT: 64 IN | RESPIRATION RATE: 18 BRPM | DIASTOLIC BLOOD PRESSURE: 81 MMHG | SYSTOLIC BLOOD PRESSURE: 145 MMHG

## 2024-12-08 DIAGNOSIS — R19.7 NAUSEA VOMITING AND DIARRHEA: Primary | ICD-10-CM

## 2024-12-08 DIAGNOSIS — K52.9 ACUTE GASTROENTERITIS: ICD-10-CM

## 2024-12-08 DIAGNOSIS — R11.2 NAUSEA VOMITING AND DIARRHEA: Primary | ICD-10-CM

## 2024-12-08 LAB
ALBUMIN SERPL-MCNC: 3.7 G/DL (ref 3.5–5)
ALBUMIN/GLOB SERPL: 1 (ref 1.1–2.2)
ALP SERPL-CCNC: 127 U/L (ref 45–117)
ALT SERPL-CCNC: 20 U/L (ref 12–78)
ANION GAP SERPL CALC-SCNC: 5 MMOL/L (ref 2–12)
AST SERPL-CCNC: 14 U/L (ref 15–37)
BASOPHILS # BLD: 0 K/UL (ref 0–0.1)
BASOPHILS NFR BLD: 0 % (ref 0–1)
BILIRUB SERPL-MCNC: 0.2 MG/DL (ref 0.2–1)
BUN SERPL-MCNC: 14 MG/DL (ref 6–20)
BUN/CREAT SERPL: 15 (ref 12–20)
CALCIUM SERPL-MCNC: 9 MG/DL (ref 8.5–10.1)
CHLORIDE SERPL-SCNC: 106 MMOL/L (ref 97–108)
CO2 SERPL-SCNC: 26 MMOL/L (ref 21–32)
CREAT SERPL-MCNC: 0.93 MG/DL (ref 0.55–1.02)
DIFFERENTIAL METHOD BLD: ABNORMAL
EOSINOPHIL # BLD: 0 K/UL (ref 0–0.4)
EOSINOPHIL NFR BLD: 0 % (ref 0–7)
ERYTHROCYTE [DISTWIDTH] IN BLOOD BY AUTOMATED COUNT: 13 % (ref 11.5–14.5)
GLOBULIN SER CALC-MCNC: 3.7 G/DL (ref 2–4)
GLUCOSE SERPL-MCNC: 136 MG/DL (ref 65–100)
HCG SERPL QL: NEGATIVE
HCT VFR BLD AUTO: 45.3 % (ref 35–47)
HGB BLD-MCNC: 14.8 G/DL (ref 11.5–16)
IMM GRANULOCYTES # BLD AUTO: 0 K/UL (ref 0–0.04)
IMM GRANULOCYTES NFR BLD AUTO: 0 % (ref 0–0.5)
LIPASE SERPL-CCNC: 43 U/L (ref 13–75)
LYMPHOCYTES # BLD: 1.9 K/UL (ref 0.8–3.5)
LYMPHOCYTES NFR BLD: 15 % (ref 12–49)
MCH RBC QN AUTO: 28.8 PG (ref 26–34)
MCHC RBC AUTO-ENTMCNC: 32.7 G/DL (ref 30–36.5)
MCV RBC AUTO: 88.1 FL (ref 80–99)
MONOCYTES # BLD: 0.5 K/UL (ref 0–1)
MONOCYTES NFR BLD: 4 % (ref 5–13)
NEUTS SEG # BLD: 10.3 K/UL (ref 1.8–8)
NEUTS SEG NFR BLD: 81 % (ref 32–75)
NRBC # BLD: 0 K/UL (ref 0–0.01)
NRBC BLD-RTO: 0 PER 100 WBC
PLATELET # BLD AUTO: 345 K/UL (ref 150–400)
PMV BLD AUTO: 10.4 FL (ref 8.9–12.9)
POTASSIUM SERPL-SCNC: 3.8 MMOL/L (ref 3.5–5.1)
PROT SERPL-MCNC: 7.4 G/DL (ref 6.4–8.2)
RBC # BLD AUTO: 5.14 M/UL (ref 3.8–5.2)
SODIUM SERPL-SCNC: 137 MMOL/L (ref 136–145)
WBC # BLD AUTO: 12.7 K/UL (ref 3.6–11)

## 2024-12-08 PROCEDURE — 2580000003 HC RX 258: Performed by: EMERGENCY MEDICINE

## 2024-12-08 PROCEDURE — 93005 ELECTROCARDIOGRAM TRACING: CPT | Performed by: EMERGENCY MEDICINE

## 2024-12-08 PROCEDURE — 84703 CHORIONIC GONADOTROPIN ASSAY: CPT

## 2024-12-08 PROCEDURE — 6360000002 HC RX W HCPCS: Performed by: EMERGENCY MEDICINE

## 2024-12-08 PROCEDURE — 99284 EMERGENCY DEPT VISIT MOD MDM: CPT

## 2024-12-08 PROCEDURE — 6370000000 HC RX 637 (ALT 250 FOR IP): Performed by: EMERGENCY MEDICINE

## 2024-12-08 PROCEDURE — 36415 COLL VENOUS BLD VENIPUNCTURE: CPT

## 2024-12-08 PROCEDURE — 96375 TX/PRO/DX INJ NEW DRUG ADDON: CPT

## 2024-12-08 PROCEDURE — 85025 COMPLETE CBC W/AUTO DIFF WBC: CPT

## 2024-12-08 PROCEDURE — 83690 ASSAY OF LIPASE: CPT

## 2024-12-08 PROCEDURE — 96374 THER/PROPH/DIAG INJ IV PUSH: CPT

## 2024-12-08 PROCEDURE — 80053 COMPREHEN METABOLIC PANEL: CPT

## 2024-12-08 RX ORDER — 0.9 % SODIUM CHLORIDE 0.9 %
1000 INTRAVENOUS SOLUTION INTRAVENOUS ONCE
Status: COMPLETED | OUTPATIENT
Start: 2024-12-08 | End: 2024-12-08

## 2024-12-08 RX ORDER — LOPERAMIDE HYDROCHLORIDE 2 MG/1
4 CAPSULE ORAL ONCE
Status: COMPLETED | OUTPATIENT
Start: 2024-12-08 | End: 2024-12-08

## 2024-12-08 RX ORDER — ONDANSETRON 2 MG/ML
4 INJECTION INTRAMUSCULAR; INTRAVENOUS ONCE
Status: COMPLETED | OUTPATIENT
Start: 2024-12-08 | End: 2024-12-08

## 2024-12-08 RX ORDER — METOCLOPRAMIDE 10 MG/1
10 TABLET ORAL 3 TIMES DAILY PRN
Qty: 20 TABLET | Refills: 1 | Status: SHIPPED | OUTPATIENT
Start: 2024-12-08

## 2024-12-08 RX ORDER — LOPERAMIDE HYDROCHLORIDE 2 MG/1
2 TABLET ORAL 4 TIMES DAILY PRN
Qty: 20 TABLET | Refills: 0 | Status: SHIPPED | OUTPATIENT
Start: 2024-12-08 | End: 2024-12-18

## 2024-12-08 RX ORDER — METOCLOPRAMIDE HYDROCHLORIDE 5 MG/ML
10 INJECTION INTRAMUSCULAR; INTRAVENOUS ONCE
Status: COMPLETED | OUTPATIENT
Start: 2024-12-08 | End: 2024-12-08

## 2024-12-08 RX ADMIN — METOCLOPRAMIDE 10 MG: 5 INJECTION, SOLUTION INTRAMUSCULAR; INTRAVENOUS at 10:12

## 2024-12-08 RX ADMIN — LOPERAMIDE HYDROCHLORIDE 4 MG: 2 CAPSULE ORAL at 08:00

## 2024-12-08 RX ADMIN — SODIUM CHLORIDE 1000 ML: 9 INJECTION, SOLUTION INTRAVENOUS at 07:04

## 2024-12-08 RX ADMIN — ONDANSETRON 4 MG: 2 INJECTION INTRAMUSCULAR; INTRAVENOUS at 07:03

## 2024-12-08 ASSESSMENT — PAIN DESCRIPTION - ORIENTATION: ORIENTATION: MID

## 2024-12-08 ASSESSMENT — PAIN DESCRIPTION - LOCATION: LOCATION: ABDOMEN

## 2024-12-08 ASSESSMENT — PAIN DESCRIPTION - DESCRIPTORS: DESCRIPTORS: NAGGING

## 2024-12-08 ASSESSMENT — LIFESTYLE VARIABLES
HOW MANY STANDARD DRINKS CONTAINING ALCOHOL DO YOU HAVE ON A TYPICAL DAY: 1 OR 2
HOW OFTEN DO YOU HAVE A DRINK CONTAINING ALCOHOL: MONTHLY OR LESS

## 2024-12-08 ASSESSMENT — PAIN SCALES - GENERAL: PAINLEVEL_OUTOF10: 7

## 2024-12-08 NOTE — ED PROVIDER NOTES
the care plan formulated and outlined with them.     Please note that this dictation was completed with Dragon voice recognition software. Quite often unanticipated grammatical, syntax, homophones, and other interpretive errors are inadvertently transcribed by the computer software.   Please disregard these errors and excuse any errors that have escaped final proofreading.    Yifan Lake MD    I personally performed the services described in this documentation on this date 12/8/24  for patient Libby Ramírez.      Yifan Lake MD  11:09 AM         Yifan Lake MD  12/08/24 1110

## 2024-12-08 NOTE — ED NOTES
breast pump order form given and instructions explained for picking up pump. No further questions.    Time spent in room: 5min  Hiwot Burks RN, BSN, IBCLC     Bedside and Verbal shift change report given to Cleo (oncoming nurse) by Ervin (offgoing nurse). Report included the following information Nurse Handoff Report, ED SBAR, Adult Overview, MAR, Recent Results, and Neuro Assessment.     SOULEYMANE

## 2024-12-08 NOTE — DISCHARGE INSTRUCTIONS
It was a pleasure taking care of you at [facility] Emergency [facility] today.  We know that when you come to [facility], you are entrusting us with your health, comfort, and safety.  Our physicians and nurses honor that trust, and we appreciate the opportunity to care for you and your loved ones.  We also value your feedback, and we would like to hear from you.    If you receive a  >>> survey <<< about your Emergency Department experience today, please fill it out. We review every single response from our patients. Thank you!

## 2024-12-08 NOTE — ED NOTES
Patient discharged by RAMOS Gallo. Patient provided with discharge instructions Rx and instructions on follow up care. Patient ambulatory out of ED with steady gait. No acute distress noted at this time

## 2024-12-10 LAB
EKG ATRIAL RATE: 70 BPM
EKG DIAGNOSIS: NORMAL
EKG P AXIS: 60 DEGREES
EKG P-R INTERVAL: 138 MS
EKG Q-T INTERVAL: 416 MS
EKG QRS DURATION: 74 MS
EKG QTC CALCULATION (BAZETT): 449 MS
EKG R AXIS: 12 DEGREES
EKG T AXIS: 54 DEGREES
EKG VENTRICULAR RATE: 70 BPM

## 2025-02-06 DIAGNOSIS — F41.0 PANIC DISORDER (EPISODIC PAROXYSMAL ANXIETY): ICD-10-CM
